# Patient Record
Sex: FEMALE | Race: WHITE | URBAN - METROPOLITAN AREA
[De-identification: names, ages, dates, MRNs, and addresses within clinical notes are randomized per-mention and may not be internally consistent; named-entity substitution may affect disease eponyms.]

---

## 2017-01-26 ENCOUNTER — APPOINTMENT (OUTPATIENT)
Dept: CT IMAGING | Facility: CLINIC | Age: 82
End: 2017-01-26
Attending: EMERGENCY MEDICINE
Payer: MEDICARE

## 2017-01-26 ENCOUNTER — APPOINTMENT (OUTPATIENT)
Dept: GENERAL RADIOLOGY | Facility: CLINIC | Age: 82
End: 2017-01-26
Attending: EMERGENCY MEDICINE
Payer: MEDICARE

## 2017-01-26 ENCOUNTER — HOSPITAL ENCOUNTER (OUTPATIENT)
Facility: CLINIC | Age: 82
Setting detail: OBSERVATION
Discharge: HOME | End: 2017-01-28
Attending: EMERGENCY MEDICINE | Admitting: INTERNAL MEDICINE
Payer: MEDICARE

## 2017-01-26 DIAGNOSIS — S29.9XXA CHEST WALL INJURY, INITIAL ENCOUNTER: ICD-10-CM
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
## 2017-01-26 DIAGNOSIS — I15.8 OTHER SECONDARY HYPERTENSION: ICD-10-CM

## 2017-01-26 LAB
ANION GAP SERPL CALCULATED.3IONS-SCNC: 11 MMOL/L (ref 3–14)
BASOPHILS # BLD AUTO: 0 10E9/L (ref 0–0.2)
BASOPHILS NFR BLD AUTO: 0.4 %
BUN SERPL-MCNC: 23 MG/DL (ref 7–30)
CALCIUM SERPL-MCNC: 8.8 MG/DL (ref 8.5–10.1)
CHLORIDE SERPL-SCNC: 104 MMOL/L (ref 94–109)
CO2 SERPL-SCNC: 23 MMOL/L (ref 20–32)
CREAT SERPL-MCNC: 1.27 MG/DL (ref 0.52–1.04)
DIFFERENTIAL METHOD BLD: ABNORMAL
EOSINOPHIL # BLD AUTO: 0.1 10E9/L (ref 0–0.7)
EOSINOPHIL NFR BLD AUTO: 0.5 %
ERYTHROCYTE [DISTWIDTH] IN BLOOD BY AUTOMATED COUNT: 14.1 % (ref 10–15)
GFR SERPL CREATININE-BSD FRML MDRD: 39 ML/MIN/1.7M2
GLUCOSE SERPL-MCNC: 196 MG/DL (ref 70–99)
HCT VFR BLD AUTO: 38.3 % (ref 35–47)
HGB BLD-MCNC: 12.7 G/DL (ref 11.7–15.7)
IMM GRANULOCYTES # BLD: 0.1 10E9/L (ref 0–0.4)
IMM GRANULOCYTES NFR BLD: 0.9 %
INR PPP: 1.1 (ref 0.86–1.14)
LYMPHOCYTES # BLD AUTO: 0.6 10E9/L (ref 0.8–5.3)
LYMPHOCYTES NFR BLD AUTO: 5.6 %
MCH RBC QN AUTO: 29.3 PG (ref 26.5–33)
MCHC RBC AUTO-ENTMCNC: 33.2 G/DL (ref 31.5–36.5)
MCV RBC AUTO: 89 FL (ref 78–100)
MONOCYTES # BLD AUTO: 0.9 10E9/L (ref 0–1.3)
MONOCYTES NFR BLD AUTO: 9.2 %
NEUTROPHILS # BLD AUTO: 8.1 10E9/L (ref 1.6–8.3)
NEUTROPHILS NFR BLD AUTO: 83.4 %
NRBC # BLD AUTO: 0 10*3/UL
NRBC BLD AUTO-RTO: 0 /100
PLATELET # BLD AUTO: 316 10E9/L (ref 150–450)
POTASSIUM SERPL-SCNC: 4.4 MMOL/L (ref 3.4–5.3)
RBC # BLD AUTO: 4.33 10E12/L (ref 3.8–5.2)
SODIUM SERPL-SCNC: 138 MMOL/L (ref 133–144)
TROPONIN I SERPL-MCNC: NORMAL UG/L (ref 0–0.04)
WBC # BLD AUTO: 9.7 10E9/L (ref 4–11)

## 2017-01-26 PROCEDURE — 70450 CT HEAD/BRAIN W/O DYE: CPT

## 2017-01-26 PROCEDURE — 72125 CT NECK SPINE W/O DYE: CPT

## 2017-01-26 PROCEDURE — 96361 HYDRATE IV INFUSION ADD-ON: CPT

## 2017-01-26 PROCEDURE — 72170 X-RAY EXAM OF PELVIS: CPT

## 2017-01-26 PROCEDURE — 84484 ASSAY OF TROPONIN QUANT: CPT | Performed by: EMERGENCY MEDICINE

## 2017-01-26 PROCEDURE — 80048 BASIC METABOLIC PNL TOTAL CA: CPT | Performed by: EMERGENCY MEDICINE

## 2017-01-26 PROCEDURE — 85025 COMPLETE CBC W/AUTO DIFF WBC: CPT | Performed by: EMERGENCY MEDICINE

## 2017-01-26 PROCEDURE — 25000128 H RX IP 250 OP 636: Performed by: EMERGENCY MEDICINE

## 2017-01-26 PROCEDURE — 93005 ELECTROCARDIOGRAM TRACING: CPT

## 2017-01-26 PROCEDURE — 85610 PROTHROMBIN TIME: CPT | Performed by: EMERGENCY MEDICINE

## 2017-01-26 PROCEDURE — 25000132 ZZH RX MED GY IP 250 OP 250 PS 637: Mod: GY | Performed by: EMERGENCY MEDICINE

## 2017-01-26 PROCEDURE — 73030 X-RAY EXAM OF SHOULDER: CPT | Mod: RT

## 2017-01-26 PROCEDURE — 96360 HYDRATION IV INFUSION INIT: CPT

## 2017-01-26 PROCEDURE — 71010 XR CHEST 1 VW: CPT

## 2017-01-26 PROCEDURE — 99285 EMERGENCY DEPT VISIT HI MDM: CPT | Mod: 25

## 2017-01-26 PROCEDURE — A9270 NON-COVERED ITEM OR SERVICE: HCPCS | Mod: GY | Performed by: EMERGENCY MEDICINE

## 2017-01-26 RX ORDER — LIDOCAINE 40 MG/G
CREAM TOPICAL
Status: DISCONTINUED | OUTPATIENT
Start: 2017-01-26 | End: 2017-01-28 | Stop reason: HOSPADM

## 2017-01-26 RX ORDER — HYDROCODONE BITARTRATE AND ACETAMINOPHEN 5; 325 MG/1; MG/1
2 TABLET ORAL ONCE
Status: COMPLETED | OUTPATIENT
Start: 2017-01-26 | End: 2017-01-26

## 2017-01-26 RX ORDER — HYDROCODONE BITARTRATE AND ACETAMINOPHEN 5; 325 MG/1; MG/1
1 TABLET ORAL 2 TIMES DAILY PRN
Qty: 8 TABLET | Refills: 0 | Status: SHIPPED | OUTPATIENT
Start: 2017-01-26

## 2017-01-26 RX ORDER — SODIUM CHLORIDE 9 MG/ML
INJECTION, SOLUTION INTRAVENOUS CONTINUOUS
Status: DISCONTINUED | OUTPATIENT
Start: 2017-01-26 | End: 2017-01-27

## 2017-01-26 RX ADMIN — SODIUM CHLORIDE: 9 INJECTION, SOLUTION INTRAVENOUS at 18:30

## 2017-01-26 RX ADMIN — HYDROCODONE BITARTRATE AND ACETAMINOPHEN 2 TABLET: 5; 325 TABLET ORAL at 21:57

## 2017-01-26 ASSESSMENT — ENCOUNTER SYMPTOMS
BACK PAIN: 0
SHORTNESS OF BREATH: 0
NECK PAIN: 1
HEADACHES: 0
MYALGIAS: 1

## 2017-01-26 NOTE — ED AVS SNAPSHOT
MRN:[id number]                      After Visit Summary   1/26/2017    Nuria Marinelli    MRN: [id number]           Thank you!     Thank you for choosing [facility] for your care. Our goal is always to provide you with excellent care. Hearing back from our patients is one way we can continue to improve our services. Please take a few minutes to complete the written survey that you may receive in the mail after you visit. If you would like to speak to someone directly about your visit please contact Patient Relations at [phone]. Thank you!          Patient Information     Date Of Birth          7/18/1925        About your hospital stay     You were admitted on:  January 27, 2017 You last received care in the:  [facility] Observation Department    You were discharged on:  January 28, 2017        Reason for your hospital stay       You were admitted for concerns of falling and pain related to that. Your pain improved with low dose oxcodone and tylenol. At home we recommend you primarily use the tylenol and if needed small dose oxycodone. If you use the oxycodone you should take a stool softener.    Your creatinine was a little high here so we held your Losartan while you were here but your blood pressure is high so we would like you to restart.                  Who to Call     For medical emergencies, please call 911.  For non-urgent questions about your medical care, please call your primary care provider or clinic, [phone]          Attending Provider     Provider    Roderick Barba MD Abdissa, Mesfin E, MD Brown-Switzer, Christa L,        Primary Care Provider Office Phone # Fax #    Kimani Fletcher [phone]       [facility] [address]  [facility] [address]        After Care Instructions     Activity       Your activity upon discharge: activity as tolerated            Diet       Follow this diet upon discharge:  Regular                  Follow-up Appointments     Follow-up and recommended labs and tests        Follow up with primary care provider, NATALI SERRANO, within 7 days to evaluate medication change and for hospital follow- up.  Repeat creatinine level and follow up on blood pressure.                             Further instructions from your care team       Discharge Instructions  Trauma    You were seen today for an injury due to some kind of trauma (crash, fall, etc.).  Some injuries may not show up until after you leave the Emergency Department.  It is important that you pay attention to these instructions and follow-up with your regular doctor as instructed.    Return to the Emergency Department right away if:    You have abdominal pain or bruises, chest pain, pain in a new area, or pain that is getting worse.    You get short of breath.    You develop a fever over 101 degrees.    You have weakness in your arms or legs.    You faint or you are very lightheaded.    You have any new symptoms, you are feeling weak or unusually ill, or something worries you.     Injuries to the brain are possible with any accident.  Return right away if you have confusion, vomiting more than once, difficulty walking or a headache that is getting worse. Bring a child or a person who can t talk back if they seem to be behaving in an abnormal way.      MORE INFORMATION:    General Injuries:    Aches and pains are usually worse the day after your accident, but should not be severe, and should start getting better after that. Aches and pains are common in the neck and back.    Injuries from your accident may prevent you from working.  Follow-up with your regular doctor to get a work note and to find out how long you will not be able to work.    Pain medications or your injuries may make it unsafe for you to drive or operate machinery.    Use ice to injured areas for the first one or two days. Apply a bag of ice wrapped in a cloth for  about 15 minutes at a time. You can do this as often as once an hour. Do not sleep with an ice pack, since it can burn you.     You can use non-prescription pain medicine, like Tylenol  (acetaminophen), Advil  (ibuprofen), Motrin  (ibuprofen), Nuprin  (ibuprofen) if your emergency doctor or your own doctor told you this is okay. Tylenol  (acetaminophen) is in many prescription medicines and non-prescription medicines--check all of your medicines to be sure you aren t taking more than 3000 mg per day.    Limit your activity for at least one or two days. Avoid doing things that hurt.    You need to see your doctor if any injured area is not back to normal in 1 week.    Car Accident:    If you have been on a backboard or had a neck collar on, this may make you stiff and sore.  This should get better in 1-2 days.  Return to the Emergency Department if the pain or discomfort is severe or gets worse.    Be careful of shards of glass on your body or in your belongings.    Fractures, Sprains, and Strains:    Return to the Emergency Department right away if your injured area gets more painful, if the splint or dressing seems to be too tight, if it gets numb or tingly past the injury, or if the area past the injury gets pale, blue, or cold.    Use your crutches if you were given them today. Don t put weight on the injured area until the pain is gone.    Keep the injured area above the level of your heart while laying or sitting down.  This well help lessen the swelling (puffiness) and the pain.    You may use an elastic bandage (Ace  Wrap) if it makes you more comfortable. Wrap it just tight enough to provide mild compression, and loosen it if you get swelling past the bandage.    Note about X-rays: If you had x-rays done today, they were read by your emergency physician. They will also be read later by a radiologist. We will contact you if the radiologist thinks they show something different than the emergency physician did.  Remember that there are some fractures (breaks in the bone) that can t be seen right away. Even if your x-rays today were normal, you must see your doctor in clinic to re-check.     Splints:    A splint put on in the Emergency Department is temporary. Your regular doctor or orthopedic doctor will remove it, and replace it with a cast or boot if needed.    Keep the splint dry. Cover it with a plastic bag when you wash. Even with a plastic bag, you still can t get in water or let water get right on it. If it does get wet, you should come back or see your doctor to have it replaced.    Do not put objects inside the splint to scratch.    If there is an elastic bandage (Ace  Wrap) holding the splint on this may be loosened a little to relieve pressure or pain.  If pain continues return to the Emergency Department right away.    Return if the splint starts cutting into your skin.    Do not remove your splint by yourself unless told to by your doctor. You can t take it off and put it back on again.     Wounds:    Infections can follow many injuries. Watch for fevers, redness spreading from the wound, pus or stitches that open up. Return here or see your doctor if these happen.    There can always be glass, wood, dirt or other things in any wound.  They won t always show up even on x-rays.  If a wound doesn t heal, this may be why, and it is important to follow-up with your regular doctor. Small pieces of glass or other materials may work their way out on their own.    Cuts or scrapes may start to bleed after leaving the Emergency Department.  If this happens, hold pressure on the bleeding area with a clean cloth or put pressure over the bandage.  If the bleeding doesn t stop after you use constant pressure for   hour, you should return to the Emergency Department for further treatment.    Any bandage or dressing put on here should be removed in 12-24 hours, or as your doctor instructs. Remove the dressing sooner if it  seems too tight or painful, or if it is getting numb, tingly, or pale past the dressing.    After you take off the dressing, wash the cut or scrape with soap and water once or twice a day.    Apply ointment like Bacitracin  (polypeptide antibiotic) to scrapes or cuts, and keep them covered with a Band-Aid  or gauze if possible, until they heal up or until your stitches are taken out.    Dermabond  or Steri-Strips  should be left alone and will come off by themselves.  Dissolving stitches should go away or fall out within about a week.    Regular stitches need to be taken out by your doctor in clinic.  Call today and schedule an appointment.  Leave your stitches in for as long as you were told today.    Most injuries are preventable!  As your local emergency physicians, we encourage you to:    Wear your seat belt.    Do not talk on your cell phone while driving.    Do not read or send text messages while driving.    Wear a bike or motorcycle helmet.    Wear a helmet while skiing and snowboarding.    Wear personal flotation devices at all times while on the water.    Always have your child in a car seat.    Do not allow children less than 12 years old to ride in the front seat.    Go to the CDC website to find more information on preventing injures:  http://www.cdc.gov/injury/index.html    If you were given a prescription for medicine here today, be sure to read all of the information (including the package insert) that comes with your prescription.  This will include important information about the medicine, its side effects, and any warnings that you need to know about.  The pharmacist who fills the prescription can provide more information and answer questions you may have about the medicine.  If you have questions or concerns that the pharmacist cannot address, please call or return to the Emergency Department.     Opioid Medication Information    Pain medications are among the most commonly prescribed medicines,  so we are including this information for all our patients. If you did not receive pain medication or get a prescription for pain medicine, you can ignore it.     You may have been given a prescription for an opioid (narcotic) pain medicine and/or have received a pain medicine while here in the Emergency Department. These medicines can make you drowsy or impaired. You must not drive, operate dangerous equipment, or engage in any other dangerous activities while taking these medications. If you drive while taking these medications, you could be arrested for DUI, or driving under the influence. Do not drink any alcohol while you are taking these medications.     Opioid pain medications can cause addiction. If you have a history of chemical dependency of any type, you are at a higher risk of becoming addicted to pain medications.  Only take these prescribed medications to treat your pain when all other options have been tried. Take it for as short a time and as few doses as possible. Store your pain pills in a secure place, as they are frequently stolen and provide a dangerous opportunity for children or visitors in your house to start abusing these powerful medications. We will not replace any lost or stolen medicine.  As soon as your pain is better, you should flush all your remaining medication.     Many prescription pain medications contain Tylenol  (acetaminophen), including Vicodin , Tylenol #3 , Norco , Lortab , and Percocet .  You should not take any extra pills of Tylenol  if you are using these prescription medications or you can get very sick.  Do not ever take more than 3000 mg of acetaminophen in any 24 hour period.    All opioids tend to cause constipation. Drink plenty of water and eat foods that have a lot of fiber, such as fruits, vegetables, prune juice, apple juice and high fiber cereal.  Take a laxative if you don t move your bowels at least every other day. Miralax , Milk of Magnesia, Colace , or  Senna  can be used to keep you regular.      Remember that you can always come back to the Emergency Department if you are not able to see your regular doctor in the amount of time listed above, if you get any new symptoms, or if there is anything that worries you.      Discharge Instructions  Incidental Findings    An incidental finding is something unexpected that was found while you were being treated today.  While this finding is not an emergency, you will want to follow up with your primary doctor to determine if anything should be done about it.    These findings can come from:    Checking your vital signs (example: high blood pressure).    Taking your history (example: unexplained weight loss).    The physical exam (example: a heart murmur).    Laboratory study (example: anemia).    X-rays/ultrasound/CT or other imaging (example: an unexplained mass).    Return to the Emergency Department if:    Your condition worsens.    You develop unexpected pain.    You now develop new symptoms or have new concerns.    Follow up with your doctor:    Follow up within the next week to discuss the results of all of your tests and about the main reason for your visit today.    Inform your doctor of what testing you had done today. You may want to obtain copies of your results from the medical records department.    Modern medical technology has become very sensitive.  Unexpected or incidental findings are very common and do not always indicate a problem.  However, sometimes problems are found very early before symptoms have even started. While these findings are not an emergency, this may allow your primary care doctor to start the earliest treatment possible. Sometimes these incidental findings are not discovered until later when the final report is reported or when your primary care doctor compares our finding to your previous studies. Therefore, it is important for you to always review all results and studies with your  "primary care doctor or clinic after an Emergency Department visit.  If you were given a prescription for medicine here today, be sure to read all of the information (including the package insert) that comes with your prescription.  This will include important information about the medicine, its side effects, and any warnings that you need to know about.  The pharmacist who fills the prescription can provide more information and answer questions you may have about the medicine.  If you have questions or concerns that the pharmacist cannot address, please call or return to the Emergency Department.   Remember that you can always come back to the Emergency Department if you are not able to see your regular doctor in the amount of time listed above, if you get any new symptoms, or if there is anything that worries you.        Pending Results     Date and Time Order Name Status Description    1/28/2017 0610 Urine Culture Aerobic Bacterial Preliminary             Statement of Approval     Ordered          01/28/17 1129  I have reviewed and agree with all the recommendations and orders detailed in this document.   EFFECTIVE NOW     Approved and electronically signed by:  Gissell Gann PA-C             Admission Information        Provider Department Dept Phone    1/26/2017 Adela Hooker DO  Observation Dept [phone]      Your Vitals Were     Blood Pressure Pulse Temperature Respirations Weight Pulse Oximetry    150/55 mmHg 59 97.6  F (36.4  C) (Oral) 20 67.586 kg (149 lb) 93%      MyChart Information     [employer] lets you send messages to your doctor, view your test results, renew your prescriptions, schedule appointments and more. To sign up, go to [email or web address] . Click on \"Log in\" on the left side of the screen, which will take you to the Welcome page. Then click on \"Sign up Now\" on the right side of the page.     You will be asked to enter the access code listed below, as well as " some personal information. Please follow the directions to create your username and password.     Your access code is: [email or web address]  Expires: 2017 10:45 AM     Your access code will  in 90 days. If you need help or a new code, please call your [facility] clinic or [phone].        Care EveryWhere ID     This is your Care EveryWhere ID. This could be used by other organizations to access your [facility] medical records  [id number]           Review of your medicines      START taking        Dose / Directions    acetaminophen 500 MG tablet   Commonly known as:  TYLENOL   Used for:  Fall, initial encounter        Dose:  500-1000 mg   Take 1-2 tablets (500-1,000 mg) by mouth every 6 hours as needed for mild pain   Refills:  0       HYDROcodone-acetaminophen 5-325 MG per tablet   Commonly known as:  NORCO        Dose:  1 tablet   Take 1 tablet by mouth 2 times daily as needed for moderate to severe pain   Quantity:  8 tablet   Refills:  0       oxyCODONE 5 MG IR tablet   Commonly known as:  ROXICODONE   Used for:  Fall, initial encounter        Dose:  2.5 mg   Take 0.5 tablets (2.5 mg) by mouth every 3 hours as needed for breakthrough pain or moderate to severe pain   Quantity:  6 tablet   Refills:  0         CONTINUE these medicines which have NOT CHANGED        Dose / Directions    blood glucose monitoring test strip   Commonly known as:  ONE TOUCH TEST STRIPS   Used for:  Type 2 diabetes, HbA1C goal < 8% (H)        by In Vitro route. Test as directed   Quantity:  1 Box   Refills:  12       BLOOD GLUCOSE TEST STRIPS STRP   Used for:  Essential hypertension, benign, Type II or unspecified type diabetes mellitus without mention of complication, not stated as uncontrolled        for one touch ultra meter   Quantity:  testing BID   Refills:  prn one yr       GLUCOSE CONTROL Liqd   Used for:  Essential hypertension, benign, Type II or unspecified type diabetes mellitus without mention of complication, not stated  as uncontrolled        for one touch ultra   Quantity:  testing BID   Refills:  prn one yr       insulin glargine 100 UNIT/ML injection   Commonly known as:  LANTUS        Dose:  30 Units   Inject 30 Units Subcutaneous At Bedtime.   Refills:  0       LOSARTAN POTASSIUM PO        Dose:  100 mg   Take 100 mg by mouth every evening   Refills:  0       metoprolol 25 MG 24 hr tablet   Commonly known as:  TOPROL-XL        Dose:  25 mg   Take 25 mg by mouth every evening   Refills:  0       SERTRALINE HCL PO        Dose:  50 mg   Take 50 mg by mouth every evening   Refills:  0       thin lancets   Commonly known as:  NO BRAND SPECIFIED   Used for:  Essential hypertension, benign        Dose:  1 Units   1 Units daily.   Quantity:  90 each   Refills:  3       traZODone 50 MG tablet   Commonly known as:  DESYREL        Dose:   mg   Take  mg by mouth nightly as needed (manic episodes)   Refills:  0            Where to get your medicines      Some of these will need a paper prescription and others can be bought over the counter. Ask your nurse if you have questions.     Bring a paper prescription for each of these medications    - HYDROcodone-acetaminophen 5-325 MG per tablet  - oxyCODONE 5 MG IR tablet    You don't need a prescription for these medications    - acetaminophen 500 MG tablet             Protect others around you: Learn how to safely use, store and throw away your medicines at www.disposemymeds.org.             Medication List: This is a list of all your medications and when to take them. Check marks below indicate your daily home schedule. Keep this list as a reference.      Medications           Morning Afternoon Evening Bedtime As Needed    acetaminophen 500 MG tablet   Commonly known as:  TYLENOL   Take 1-2 tablets (500-1,000 mg) by mouth every 6 hours as needed for mild pain   Last time this was given:  650 mg on 1/28/2017 10:06 AM                                blood glucose monitoring test strip    Commonly known as:  ONE TOUCH TEST STRIPS   by In Vitro route. Test as directed                                BLOOD GLUCOSE TEST STRIPS STRP   for one touch ultra meter                                GLUCOSE CONTROL Liqd   for one touch ultra                                HYDROcodone-acetaminophen 5-325 MG per tablet   Commonly known as:  NORCO   Take 1 tablet by mouth 2 times daily as needed for moderate to severe pain   Last time this was given:  2 tablets on 1/26/2017  9:57 PM                                insulin glargine 100 UNIT/ML injection   Commonly known as:  LANTUS   Inject 30 Units Subcutaneous At Bedtime.   Last time this was given:  15 Units on 1/27/2017 10:10 PM                                LOSARTAN POTASSIUM PO   Take 100 mg by mouth every evening                                metoprolol 25 MG 24 hr tablet   Commonly known as:  TOPROL-XL   Take 25 mg by mouth every evening   Last time this was given:  25 mg on 1/27/2017  8:16 PM                                oxyCODONE 5 MG IR tablet   Commonly known as:  ROXICODONE   Take 0.5 tablets (2.5 mg) by mouth every 3 hours as needed for breakthrough pain or moderate to severe pain   Last time this was given:  2.5 mg on 1/28/2017 10:06 AM                                SERTRALINE HCL PO   Take 50 mg by mouth every evening   Last time this was given:  50 mg on 1/28/2017  8:42 AM                                thin lancets   Commonly known as:  NO BRAND SPECIFIED   1 Units daily.                                traZODone 50 MG tablet   Commonly known as:  DESYREL   Take  mg by mouth nightly as needed (manic episodes)                                          More Information        Patient Education    Oxycodone Hydrochloride Oral capsule    Oxycodone Hydrochloride Oral solution    Oxycodone Hydrochloride Oral tablet    Oxycodone Hydrochloride Oral tablet [Abuse Deterrent]    Oxycodone Hydrochloride Oral tablet, extended-release  Oxycodone  Hydrochloride Oral tablet  What is this medicine?  OXYCODONE (ox i KOE done) is a pain reliever. It is used to treat moderate to severe pain.  This medicine may be used for other purposes; ask your health care provider or pharmacist if you have questions.  What should I tell my health care provider before I take this medicine?  They need to know if you have any of these conditions:    Stark's disease    brain tumor    drug abuse or addiction    head injury    heart disease    if you frequently drink alcohol containing drinks    kidney disease or problems going to the bathroom    liver disease    lung disease, asthma, or breathing problems    mental problems    an unusual or allergic reaction to oxycodone, codeine, hydrocodone, morphine, other medicines, foods, dyes, or preservatives    pregnant or trying to get pregnant    breast-feeding  How should I use this medicine?  Take this medicine by mouth with a glass of water. Follow the directions on the prescription label. You can take it with or without food. If it upsets your stomach, take it with food. Take your medicine at regular intervals. Do not take it more often than directed. Do not stop taking except on your doctor's advice.  Some brands of this medicine, like Oxecta, have special instructions. Ask your doctor or pharmacist if these directions are for you: Do not cut, crush or chew this medicine. Swallow only one tablet at a time. Do not wet, soak, or lick the tablet before you take it.  Talk to your pediatrician regarding the use of this medicine in children. Special care may be needed.  Overdosage: If you think you have taken too much of this medicine contact a poison control center or emergency room at once.  NOTE: This medicine is only for you. Do not share this medicine with others.  What if I miss a dose?  If you miss a dose, take it as soon as you can. If it is almost time for your next dose, take only that dose. Do not take double or extra  doses.  What may interact with this medicine?    alcohol    antihistamines    certain medicines used for nausea like chlorpromazine, droperidol    erythromycin    ketoconazole    medicines for depression, anxiety, or psychotic disturbances    medicines for sleep    muscle relaxants    naloxone    naltrexone    narcotic medicines (opiates) for pain    nilotinib    phenobarbital    phenytoin    rifampin    ritonavir    voriconazole  This list may not describe all possible interactions. Give your health care provider a list of all the medicines, herbs, non-prescription drugs, or dietary supplements you use. Also tell them if you smoke, drink alcohol, or use illegal drugs. Some items may interact with your medicine.  What should I watch for while using this medicine?  Tell your doctor or health care professional if your pain does not go away, if it gets worse, or if you have new or a different type of pain. You may develop tolerance to the medicine. Tolerance means that you will need a higher dose of the medicine for pain relief. Tolerance is normal and is expected if you take this medicine for a long time.  Do not suddenly stop taking your medicine because you may develop a severe reaction. Your body becomes used to the medicine. This does NOT mean you are addicted. Addiction is a behavior related to getting and using a drug for a non-medical reason. If you have pain, you have a medical reason to take pain medicine. Your doctor will tell you how much medicine to take. If your doctor wants you to stop the medicine, the dose will be slowly lowered over time to avoid any side effects.  You may get drowsy or dizzy when you first start taking this medicine or change doses. Do not drive, use machinery, or do anything that may be dangerous until you know how the medicine affects you. Stand or sit up slowly.  There are different types of narcotic medicines (opiates) for pain. If you take more than one type at the same time,  you may have more side effects. Give your health care provider a list of all medicines you use. Your doctor will tell you how much medicine to take. Do not take more medicine than directed. Call emergency for help if you have problems breathing.  This medicine will cause constipation. Try to have a bowel movement at least every 2 to 3 days. If you do not have a bowel movement for 3 days, call your doctor or health care professional.  Your mouth may get dry. Drinking water, chewing sugarless gum, or sucking on hard candy may help. See your dentist every 6 months.  What side effects may I notice from receiving this medicine?  Side effects that you should report to your doctor or health care professional as soon as possible:    allergic reactions like skin rash, itching or hives, swelling of the face, lips, or tongue    breathing problems    confusion    feeling faint or lightheaded, falls    trouble passing urine or change in the amount of urine    unusually weak or tired  Side effects that usually do not require medical attention (report to your doctor or health care professional if they continue or are bothersome):    constipation    dry mouth    itching    nausea, vomiting    upset stomach  This list may not describe all possible side effects. Call your doctor for medical advice about side effects. You may report side effects to FDA at 5-610-FDA-2025.  Where should I keep my medicine?  Keep out of the reach of children. This medicine can be abused. Keep your medicine in a safe place to protect it from theft. Do not share this medicine with anyone. Selling or giving away this medicine is dangerous and against the law.  Store at room temperature between 15 and 30 degrees C (59 and 86 degrees F). Protect from light. Keep container tightly closed.  This medicine may cause accidental overdose and death if it is taken by other adults, children, or pets. Flush any unused medicine down the toilet to reduce the chance of  harm. Do not use the medicine after the expiration date.  NOTE: This sheet is a summary. It may not cover all possible information. If you have questions about this medicine, talk to your doctor, pharmacist, or health care provider.  NOTE:This sheet is a summary. It may not cover all possible information. If you have questions about this medicine, talk to your doctor, pharmacist, or health care provider. Copyright  2016 [employer] Standard

## 2017-01-26 NOTE — ED AVS SNAPSHOT
[facility] Observation Department    [address]    [facility] [address]    Phone:  [phone]                                       Nuria Marinelli   MRN: [id number]    Department:  [facility] Observation Department   Date of Visit:  1/26/2017           After Visit Summary Signature Page     I have received my discharge instructions, and my questions have been answered. I have discussed any challenges I see with this plan with the nurse or doctor.    ..........................................................................................................................................  Patient/Patient Representative Signature      ..........................................................................................................................................  Patient Representative Print Name and Relationship to Patient    ..................................................               ................................................  Date                                            Time    ..........................................................................................................................................  Reviewed by Signature/Title    ...................................................              ..............................................  Date                                                            Time
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
## 2017-01-27 LAB
ANION GAP SERPL CALCULATED.3IONS-SCNC: 6 MMOL/L (ref 3–14)
BUN SERPL-MCNC: 22 MG/DL (ref 7–30)
CALCIUM SERPL-MCNC: 8.5 MG/DL (ref 8.5–10.1)
CHLORIDE SERPL-SCNC: 109 MMOL/L (ref 94–109)
CO2 SERPL-SCNC: 25 MMOL/L (ref 20–32)
CREAT SERPL-MCNC: 1.33 MG/DL (ref 0.52–1.04)
GFR SERPL CREATININE-BSD FRML MDRD: 37 ML/MIN/1.7M2
GLUCOSE BLDC GLUCOMTR-MCNC: 138 MG/DL (ref 70–99)
GLUCOSE BLDC GLUCOMTR-MCNC: 168 MG/DL (ref 70–99)
GLUCOSE BLDC GLUCOMTR-MCNC: 204 MG/DL (ref 70–99)
GLUCOSE BLDC GLUCOMTR-MCNC: 220 MG/DL (ref 70–99)
GLUCOSE BLDC GLUCOMTR-MCNC: 226 MG/DL (ref 70–99)
GLUCOSE SERPL-MCNC: 169 MG/DL (ref 70–99)
HBA1C MFR BLD: 10.1 % (ref 4.3–6)
INTERPRETATION ECG - MUSE: NORMAL
POTASSIUM SERPL-SCNC: 4.3 MMOL/L (ref 3.4–5.3)
SODIUM SERPL-SCNC: 140 MMOL/L (ref 133–144)

## 2017-01-27 PROCEDURE — 25000128 H RX IP 250 OP 636: Performed by: INTERNAL MEDICINE

## 2017-01-27 PROCEDURE — 25000132 ZZH RX MED GY IP 250 OP 250 PS 637: Mod: GY | Performed by: INTERNAL MEDICINE

## 2017-01-27 PROCEDURE — G0378 HOSPITAL OBSERVATION PER HR: HCPCS

## 2017-01-27 PROCEDURE — 96361 HYDRATE IV INFUSION ADD-ON: CPT

## 2017-01-27 PROCEDURE — 25000128 H RX IP 250 OP 636: Performed by: EMERGENCY MEDICINE

## 2017-01-27 PROCEDURE — A9270 NON-COVERED ITEM OR SERVICE: HCPCS | Mod: GY | Performed by: PHYSICIAN ASSISTANT

## 2017-01-27 PROCEDURE — 80048 BASIC METABOLIC PNL TOTAL CA: CPT | Performed by: PHYSICIAN ASSISTANT

## 2017-01-27 PROCEDURE — 40000893 ZZH STATISTIC PT IP EVAL DEFER: Performed by: PHYSICAL THERAPIST

## 2017-01-27 PROCEDURE — A9270 NON-COVERED ITEM OR SERVICE: HCPCS | Mod: GY | Performed by: INTERNAL MEDICINE

## 2017-01-27 PROCEDURE — 83036 HEMOGLOBIN GLYCOSYLATED A1C: CPT

## 2017-01-27 PROCEDURE — 36415 COLL VENOUS BLD VENIPUNCTURE: CPT | Performed by: PHYSICIAN ASSISTANT

## 2017-01-27 PROCEDURE — 96372 THER/PROPH/DIAG INJ SC/IM: CPT

## 2017-01-27 PROCEDURE — 99220 ZZC INITIAL OBSERVATION CARE,LEVL III: CPT | Performed by: INTERNAL MEDICINE

## 2017-01-27 PROCEDURE — 00000146 ZZHCL STATISTIC GLUCOSE BY METER IP

## 2017-01-27 PROCEDURE — 25000132 ZZH RX MED GY IP 250 OP 250 PS 637: Mod: GY | Performed by: PHYSICIAN ASSISTANT

## 2017-01-27 RX ORDER — METOPROLOL SUCCINATE 25 MG/1
25 TABLET, EXTENDED RELEASE ORAL EVERY EVENING
Status: DISCONTINUED | OUTPATIENT
Start: 2017-01-27 | End: 2017-01-28 | Stop reason: HOSPADM

## 2017-01-27 RX ORDER — LOSARTAN POTASSIUM 25 MG/1
100 TABLET ORAL EVERY EVENING
Status: DISCONTINUED | OUTPATIENT
Start: 2017-01-27 | End: 2017-01-27

## 2017-01-27 RX ORDER — METOPROLOL SUCCINATE 25 MG/1
25 TABLET, EXTENDED RELEASE ORAL EVERY EVENING
COMMUNITY

## 2017-01-27 RX ORDER — TRAZODONE HYDROCHLORIDE 50 MG/1
50-100 TABLET, FILM COATED ORAL
Status: DISCONTINUED | OUTPATIENT
Start: 2017-01-27 | End: 2017-01-28 | Stop reason: HOSPADM

## 2017-01-27 RX ORDER — NICOTINE POLACRILEX 4 MG
15-30 LOZENGE BUCCAL
Status: DISCONTINUED | OUTPATIENT
Start: 2017-01-27 | End: 2017-01-28 | Stop reason: HOSPADM

## 2017-01-27 RX ORDER — ACETAMINOPHEN 325 MG/1
650 TABLET ORAL EVERY 4 HOURS PRN
Status: DISCONTINUED | OUTPATIENT
Start: 2017-01-27 | End: 2017-01-28 | Stop reason: HOSPADM

## 2017-01-27 RX ORDER — SODIUM CHLORIDE 9 MG/ML
INJECTION, SOLUTION INTRAVENOUS CONTINUOUS
Status: DISCONTINUED | OUTPATIENT
Start: 2017-01-27 | End: 2017-01-28

## 2017-01-27 RX ORDER — DEXTROSE MONOHYDRATE 25 G/50ML
25-50 INJECTION, SOLUTION INTRAVENOUS
Status: DISCONTINUED | OUTPATIENT
Start: 2017-01-27 | End: 2017-01-28 | Stop reason: HOSPADM

## 2017-01-27 RX ORDER — LIDOCAINE 50 MG/G
1 PATCH TOPICAL
Status: DISCONTINUED | OUTPATIENT
Start: 2017-01-27 | End: 2017-01-28 | Stop reason: HOSPADM

## 2017-01-27 RX ORDER — OXYCODONE HYDROCHLORIDE 5 MG/1
5-10 TABLET ORAL
Status: DISCONTINUED | OUTPATIENT
Start: 2017-01-27 | End: 2017-01-27

## 2017-01-27 RX ORDER — HYDROMORPHONE HYDROCHLORIDE 1 MG/ML
.3-.5 INJECTION, SOLUTION INTRAMUSCULAR; INTRAVENOUS; SUBCUTANEOUS
Status: DISCONTINUED | OUTPATIENT
Start: 2017-01-27 | End: 2017-01-27

## 2017-01-27 RX ORDER — METOPROLOL SUCCINATE 50 MG/1
50 TABLET, EXTENDED RELEASE ORAL DAILY
Status: DISCONTINUED | OUTPATIENT
Start: 2017-01-27 | End: 2017-01-27

## 2017-01-27 RX ORDER — NALOXONE HYDROCHLORIDE 0.4 MG/ML
.1-.4 INJECTION, SOLUTION INTRAMUSCULAR; INTRAVENOUS; SUBCUTANEOUS
Status: DISCONTINUED | OUTPATIENT
Start: 2017-01-27 | End: 2017-01-28 | Stop reason: HOSPADM

## 2017-01-27 RX ORDER — TRAZODONE HYDROCHLORIDE 50 MG/1
50-100 TABLET, FILM COATED ORAL
COMMUNITY

## 2017-01-27 RX ADMIN — INSULIN ASPART 4 UNITS: 100 INJECTION, SOLUTION INTRAVENOUS; SUBCUTANEOUS at 18:03

## 2017-01-27 RX ADMIN — INSULIN GLARGINE 15 UNITS: 100 INJECTION, SOLUTION SUBCUTANEOUS at 22:10

## 2017-01-27 RX ADMIN — ACETAMINOPHEN 650 MG: 325 TABLET, FILM COATED ORAL at 19:10

## 2017-01-27 RX ADMIN — OXYCODONE HYDROCHLORIDE 2.5 MG: 5 TABLET ORAL at 12:58

## 2017-01-27 RX ADMIN — SODIUM CHLORIDE: 9 INJECTION, SOLUTION INTRAVENOUS at 15:02

## 2017-01-27 RX ADMIN — LIDOCAINE 1 PATCH: 50 PATCH CUTANEOUS at 12:38

## 2017-01-27 RX ADMIN — METOPROLOL SUCCINATE 25 MG: 25 TABLET, EXTENDED RELEASE ORAL at 20:16

## 2017-01-27 RX ADMIN — SODIUM CHLORIDE: 9 INJECTION, SOLUTION INTRAVENOUS at 00:30

## 2017-01-27 RX ADMIN — SODIUM CHLORIDE: 9 INJECTION, SOLUTION INTRAVENOUS at 03:09

## 2017-01-27 RX ADMIN — ACETAMINOPHEN 650 MG: 325 TABLET, FILM COATED ORAL at 12:58

## 2017-01-27 RX ADMIN — OXYCODONE HYDROCHLORIDE 2.5 MG: 5 TABLET ORAL at 19:10

## 2017-01-27 RX ADMIN — SERTRALINE HYDROCHLORIDE 50 MG: 50 TABLET ORAL at 12:39

## 2017-01-27 NOTE — ED NOTES
OBSERVATION patient IN TIME: 0017  Room #208    Living Situation (if not independent, order SW consult): with daughter    Activity level at baseline: indpendent  Activity level on admit:     Is patient a falls risk? Yes  Reason for falls risk:  Mobility, hx of falls  Falls armband on?  YES  Within Arm's Reach? Yes   Bed alarm turned on?   YES  Personal alarm in place and turned on?   No    Patient registered to observation; given Patient Bill of Rights; given the opportunity to ask questions about observation status and their plan of care.  Patient has been oriented to the observation room, bathroom, and call light is in place.    : Annpooja

## 2017-01-27 NOTE — ED NOTES
RN attempted to ambulate pt per MD. Pt was in severe pain and had a very difficult time moving from position of lying flat to standing position. Pt's daughter does not feel comfortable taking pt home as she is concerned she will not be able to adequately manage pt's pain and care for her. MD notified. Pt is back in bed, warm blankets, nutrition and fluids provided.

## 2017-01-27 NOTE — ED NOTES
PRIMARY DIAGNOSIS: /right chest pain/generalized weakness  OUTPATIENT/OBSERVATION GOALS TO BE MET BEFORE DISCHARGE:    1. Tolerate PO Intake: Yes  2. Cleared for discharge from consultants involved: N/A  3. ADLs back to baseline?  No,pt has generalized weakness and needs assist of one ,pt is normally independent at home.  4. Activity and level of assistance: Up with assist of one and walker.  5. Pain status:c/o right chest pain with movement,oxycodone and tylenol given for pain,lidocaine patch applied too.  6. Barriers to discharge noted No  Is patient a falls risk? Yes  Reason for falls risk:  Mobility  Falls armband on?  YES,    Within Arm's Reach? Yes    Bed alarm turned on?   YES,    Personal alarm in place and turned on?   YES,   Pt. will probably discharge home tomorrow  with her daughter.

## 2017-01-27 NOTE — PROGRESS NOTES
[facility]  Observation Unit  Progress Note    Date of Service: 1/27/2017    Patient: Nuria Marinelli  MRN: [id number]  Admission Date: 1/26/2017  Hospital Day # 1    Assessment & Plan: Nuria Marinelli is a 89 year old female with a history of Breast Cancer, HTN, Diabetes Mellitus Type 2, Alzheimer's Dementia, PUD, GIB, Hiatel Hernia who presented to the ED from home with her daughter on 1/26/2017 after a fall 1/24/2017 with associated with chest pain and weakness,unsteady gait and decreased appetite.      Right Chest Wall and Right Shoulder Pain s/p fall on 1/24 - fall reported as mechanical in nature.  DOT likely contributing to the fall as well.  Imaging in the ED with no acute fracture identified.  Troponin negative.  Likely more MSK pain.  S/p Norco 10/325mg last night with increase lethargy today.   Family declines the need for Physical Therapy assessment or home care referral and need for social work assessment.    - continue pain control with Tylenol and low dose Oxycodone.  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  - will add Lidocaine patch        DOT - baseline creatinine 1-1.2.  Creatinine up to 1.42 since 1/17 when she was diagnosed with a UTI.  S/p 7 day course of Bactrim which she completed 1/24.  DOT likely a combination of hypovolemia, recent UTI while on Bactrim and Losartan.  Creatinine today after IVF hydration still 1.33.    - continue IVF hydration   - recheck UA  - hold Losartan dose tonight  - repeat BMP in am     Alzheimer's Dementia, Depression - diagnosed years ago.  Lives with her daughter who is very supportive.  When the daughter is not home, she has a caregiver.  Ambulates independently at home.   - continue home Trazodone qhs prn and Sertraline     HTN - stable.    - continue home Metoprolol XL  - will hold home Losartan for now 2/2 DOT    Diabetes Mellitus Type II - last hemoglobin A1C (10/2016) 9.4.  Blood sugars today ranging 138-204.  Home medications include Lantus 30 units  qhs.  Not taking po well today.  - continue home Lantus.  Will order 1/2 the home dose tonight 2/2 poor po intake  - start insulin sliding scale  - diabetic diet    Hyperlipidemia - last lipid panel (11/2015) Tchol 230, HDL 38, .  - not currently on any medications    CODE: DNR/DNI  DVT: SCD  Diet/fluids: diabetic  Disposition: likely discharge home with daughter in am    Imelda Masters MS LAZARO  Hospitalist Physician Assistant  [facility]  Pager: [phone]      Subjective & Interval Hx:    Patient has dementia and is not oriented.  She has right sided anterior shoulder and chest wall pain.  Daughter is at the bedside and helpful.  She has not eaten well today and has only ambulated to the bathroom with assistance.      Last 24 hr care team notes reviewed.   ROS:  Unable to complete 2/2 patient's dementia    Physical Exam:    Blood pressure 172/81, pulse 59, temperature 98  F (36.7  C), temperature source Axillary, resp. rate 18, weight 67.586 kg (149 lb), SpO2 93 %.  General: Alert, sleeping, easily arousable  HEENT: AT/NC, sclera anicteric, PERRL  Resp: clear to auscultation bilaterally, no crackles or wheezes  Cardiac: regular rate and rhythm.  Pain to upper right chest to palpation.  No ecchymosis present  Abdomen: Soft, nontender, nondistended. +BS.   Extremities: Patient refusing full ROM testing.  Moving RUE spontaneously and groaning in pain to palpation of right shoulder.  Skin: Warm and dry, no jaundice or rash  Neuro: Alert and knows her daughter.  Not oriented to time or place.      Labs & Images:  Reviewed in Epic   Medications:  Reviewed in [facility]  Current Facility-Administered Medications   Medication     sertraline (ZOLOFT) tablet 50 mg     glucose 40 % gel 15-30 g    Or     dextrose 50 % injection 25-50 mL    Or     glucagon injection 1 mg     naloxone (NARCAN) injection 0.1-0.4 mg     insulin aspart (NovoLOG) inj (RAPID ACTING)     insulin aspart (NovoLOG) inj (RAPID ACTING)      acetaminophen (TYLENOL) tablet 650 mg     0.9% sodium chloride infusion     oxyCODONE (ROXICODONE) IR half-tab 2.5 mg     insulin glargine (LANTUS) injection 15 Units     metoprolol (TOPROL-XL) 24 hr tablet 25 mg     traZODone (DESYREL) tablet  mg     lidocaine 1 % 1 mL     lidocaine (LMX4) kit     sodium chloride (PF) 0.9% PF flush 3 mL     sodium chloride (PF) 0.9% PF flush 3 mL     Current Outpatient Prescriptions   Medication     metoprolol (TOPROL-XL) 25 MG 24 hr tablet     traZODone (DESYREL) 50 MG tablet     HYDROcodone-acetaminophen (NORCO) 5-325 MG per tablet     SERTRALINE HCL PO     LOSARTAN POTASSIUM PO     insulin glargine (LANTUS) 100 UNIT/ML injection     Lancets Thin MISC     glucose blood VI test strips (ONE TOUCH TEST STRIPS) strip     BLOOD GLUCOSE TEST STRIPS STRP     GLUCOSE CONTROL VI LIQD

## 2017-01-27 NOTE — PHARMACY-ADMISSION MEDICATION HISTORY
Admission medication history interview status for this patient is complete. See [facility] admission navigator for allergy information, prior to admission medications and immunization status.     Medication history interview source(s):Patient's daughter manages medications  Medication history resources (including written lists, pill bottles, clinic record):None  Primary pharmacy: [facility]    Changes made to PTA medication list:  Added: trazodone prn  Deleted: iron tabs, vitamin d  Changed: metoprolol 50mg --> 25mg, added frequency to losartan    Actions taken by pharmacist (provider contacted, etc):None     Additional medication history information:None    Medication reconciliation/reorder completed by provider prior to medication history? Yes    For patients on insulin therapy: Yes  Lantus 30 units qhs  Sliding scale Novolog N  -- pt was on short-acting insulin in the past but this was dc'd by PCP d/t age & risk of hypoglycemia   Patients eat three meals a day:   Y, usually eats small amounts/snacks througout the day. Eats lots of sugar & carbs at home per daughter   Any Barriers to therapy:  patient has dementia, daughter manages        Prior to Admission medications    Medication Sig Last Dose Taking? Auth Provider   metoprolol (TOPROL-XL) 25 MG 24 hr tablet Take 25 mg by mouth every evening 1/25/2017 at pm Yes Unknown, Entered By History   traZODone (DESYREL) 50 MG tablet Take  mg by mouth nightly as needed (manic episodes) Past Month at Unknown time Yes Unknown, Entered By History   SERTRALINE HCL PO Take 50 mg by mouth every evening  1/25/2017 at pm Yes Reported, Patient   LOSARTAN POTASSIUM PO Take 100 mg by mouth every evening  1/25/2017 at pm Yes Reported, Patient   insulin glargine (LANTUS) 100 UNIT/ML injection Inject 30 Units Subcutaneous At Bedtime.   1/25/2017 at PM Yes Reported, Patient   Lancets Thin MISC 1 Units daily. Unknown at Unknown time  Jarod Mcguire MD   glucose blood VI test strips (ONE  TOUCH TEST STRIPS) strip by In Vitro route. Test as directed Unknown at Unknown time  Jarod Mcguire MD   BLOOD GLUCOSE TEST STRIPS STRP for one touch ultra meter Unknown at Unknown time  Weiler, Karen, MD   GLUCOSE CONTROL VI LIQD for one touch ultra Unknown at Unknown time  Weiler, Karen, MD

## 2017-01-27 NOTE — DISCHARGE INSTRUCTIONS
Discharge Instructions  Trauma    You were seen today for an injury due to some kind of trauma (crash, fall, etc.).  Some injuries may not show up until after you leave the Emergency Department.  It is important that you pay attention to these instructions and follow-up with your regular doctor as instructed.    Return to the Emergency Department right away if:    You have abdominal pain or bruises, chest pain, pain in a new area, or pain that is getting worse.    You get short of breath.    You develop a fever over 101 degrees.    You have weakness in your arms or legs.    You faint or you are very lightheaded.    You have any new symptoms, you are feeling weak or unusually ill, or something worries you.     Injuries to the brain are possible with any accident.  Return right away if you have confusion, vomiting more than once, difficulty walking or a headache that is getting worse. Bring a child or a person who can t talk back if they seem to be behaving in an abnormal way.      MORE INFORMATION:    General Injuries:    Aches and pains are usually worse the day after your accident, but should not be severe, and should start getting better after that. Aches and pains are common in the neck and back.    Injuries from your accident may prevent you from working.  Follow-up with your regular doctor to get a work note and to find out how long you will not be able to work.    Pain medications or your injuries may make it unsafe for you to drive or operate machinery.    Use ice to injured areas for the first one or two days. Apply a bag of ice wrapped in a cloth for about 15 minutes at a time. You can do this as often as once an hour. Do not sleep with an ice pack, since it can burn you.     You can use non-prescription pain medicine, like Tylenol  (acetaminophen), Advil  (ibuprofen), Motrin  (ibuprofen), Nuprin  (ibuprofen) if your emergency doctor or your own doctor told you this is okay. Tylenol  (acetaminophen) is in  many prescription medicines and non-prescription medicines--check all of your medicines to be sure you aren t taking more than 3000 mg per day.    Limit your activity for at least one or two days. Avoid doing things that hurt.    You need to see your doctor if any injured area is not back to normal in 1 week.    Car Accident:    If you have been on a backboard or had a neck collar on, this may make you stiff and sore.  This should get better in 1-2 days.  Return to the Emergency Department if the pain or discomfort is severe or gets worse.    Be careful of shards of glass on your body or in your belongings.    Fractures, Sprains, and Strains:    Return to the Emergency Department right away if your injured area gets more painful, if the splint or dressing seems to be too tight, if it gets numb or tingly past the injury, or if the area past the injury gets pale, blue, or cold.    Use your crutches if you were given them today. Don t put weight on the injured area until the pain is gone.    Keep the injured area above the level of your heart while laying or sitting down.  This well help lessen the swelling (puffiness) and the pain.    You may use an elastic bandage (Ace  Wrap) if it makes you more comfortable. Wrap it just tight enough to provide mild compression, and loosen it if you get swelling past the bandage.    Note about X-rays: If you had x-rays done today, they were read by your emergency physician. They will also be read later by a radiologist. We will contact you if the radiologist thinks they show something different than the emergency physician did. Remember that there are some fractures (breaks in the bone) that can t be seen right away. Even if your x-rays today were normal, you must see your doctor in clinic to re-check.     Splints:    A splint put on in the Emergency Department is temporary. Your regular doctor or orthopedic doctor will remove it, and replace it with a cast or boot if  needed.    Keep the splint dry. Cover it with a plastic bag when you wash. Even with a plastic bag, you still can t get in water or let water get right on it. If it does get wet, you should come back or see your doctor to have it replaced.    Do not put objects inside the splint to scratch.    If there is an elastic bandage (Ace  Wrap) holding the splint on this may be loosened a little to relieve pressure or pain.  If pain continues return to the Emergency Department right away.    Return if the splint starts cutting into your skin.    Do not remove your splint by yourself unless told to by your doctor. You can t take it off and put it back on again.     Wounds:    Infections can follow many injuries. Watch for fevers, redness spreading from the wound, pus or stitches that open up. Return here or see your doctor if these happen.    There can always be glass, wood, dirt or other things in any wound.  They won t always show up even on x-rays.  If a wound doesn t heal, this may be why, and it is important to follow-up with your regular doctor. Small pieces of glass or other materials may work their way out on their own.    Cuts or scrapes may start to bleed after leaving the Emergency Department.  If this happens, hold pressure on the bleeding area with a clean cloth or put pressure over the bandage.  If the bleeding doesn t stop after you use constant pressure for   hour, you should return to the Emergency Department for further treatment.    Any bandage or dressing put on here should be removed in 12-24 hours, or as your doctor instructs. Remove the dressing sooner if it seems too tight or painful, or if it is getting numb, tingly, or pale past the dressing.    After you take off the dressing, wash the cut or scrape with soap and water once or twice a day.    Apply ointment like Bacitracin  (polypeptide antibiotic) to scrapes or cuts, and keep them covered with a Band-Aid  or gauze if possible, until they heal up or  until your stitches are taken out.    Dermabond  or Steri-Strips  should be left alone and will come off by themselves.  Dissolving stitches should go away or fall out within about a week.    Regular stitches need to be taken out by your doctor in clinic.  Call today and schedule an appointment.  Leave your stitches in for as long as you were told today.    Most injuries are preventable!  As your local emergency physicians, we encourage you to:    Wear your seat belt.    Do not talk on your cell phone while driving.    Do not read or send text messages while driving.    Wear a bike or motorcycle helmet.    Wear a helmet while skiing and snowboarding.    Wear personal flotation devices at all times while on the water.    Always have your child in a car seat.    Do not allow children less than 12 years old to ride in the front seat.    Go to the CDC website to find more information on preventing injures:  http://www.cdc.gov/injury/index.html    If you were given a prescription for medicine here today, be sure to read all of the information (including the package insert) that comes with your prescription.  This will include important information about the medicine, its side effects, and any warnings that you need to know about.  The pharmacist who fills the prescription can provide more information and answer questions you may have about the medicine.  If you have questions or concerns that the pharmacist cannot address, please call or return to the Emergency Department.     Opioid Medication Information    Pain medications are among the most commonly prescribed medicines, so we are including this information for all our patients. If you did not receive pain medication or get a prescription for pain medicine, you can ignore it.     You may have been given a prescription for an opioid (narcotic) pain medicine and/or have received a pain medicine while here in the Emergency Department. These medicines can make you drowsy  or impaired. You must not drive, operate dangerous equipment, or engage in any other dangerous activities while taking these medications. If you drive while taking these medications, you could be arrested for DUI, or driving under the influence. Do not drink any alcohol while you are taking these medications.     Opioid pain medications can cause addiction. If you have a history of chemical dependency of any type, you are at a higher risk of becoming addicted to pain medications.  Only take these prescribed medications to treat your pain when all other options have been tried. Take it for as short a time and as few doses as possible. Store your pain pills in a secure place, as they are frequently stolen and provide a dangerous opportunity for children or visitors in your house to start abusing these powerful medications. We will not replace any lost or stolen medicine.  As soon as your pain is better, you should flush all your remaining medication.     Many prescription pain medications contain Tylenol  (acetaminophen), including Vicodin , Tylenol #3 , Norco , Lortab , and Percocet .  You should not take any extra pills of Tylenol  if you are using these prescription medications or you can get very sick.  Do not ever take more than 3000 mg of acetaminophen in any 24 hour period.    All opioids tend to cause constipation. Drink plenty of water and eat foods that have a lot of fiber, such as fruits, vegetables, prune juice, apple juice and high fiber cereal.  Take a laxative if you don t move your bowels at least every other day. Miralax , Milk of Magnesia, Colace , or Senna  can be used to keep you regular.      Remember that you can always come back to the Emergency Department if you are not able to see your regular doctor in the amount of time listed above, if you get any new symptoms, or if there is anything that worries you.      Discharge Instructions  Incidental Findings    An incidental finding is something  unexpected that was found while you were being treated today.  While this finding is not an emergency, you will want to follow up with your primary doctor to determine if anything should be done about it.    These findings can come from:    Checking your vital signs (example: high blood pressure).    Taking your history (example: unexplained weight loss).    The physical exam (example: a heart murmur).    Laboratory study (example: anemia).    X-rays/ultrasound/CT or other imaging (example: an unexplained mass).    Return to the Emergency Department if:    Your condition worsens.    You develop unexpected pain.    You now develop new symptoms or have new concerns.    Follow up with your doctor:    Follow up within the next week to discuss the results of all of your tests and about the main reason for your visit today.    Inform your doctor of what testing you had done today. You may want to obtain copies of your results from the medical records department.    Modern medical technology has become very sensitive.  Unexpected or incidental findings are very common and do not always indicate a problem.  However, sometimes problems are found very early before symptoms have even started. While these findings are not an emergency, this may allow your primary care doctor to start the earliest treatment possible. Sometimes these incidental findings are not discovered until later when the final report is reported or when your primary care doctor compares our finding to your previous studies. Therefore, it is important for you to always review all results and studies with your primary care doctor or clinic after an Emergency Department visit.  If you were given a prescription for medicine here today, be sure to read all of the information (including the package insert) that comes with your prescription.  This will include important information about the medicine, its side effects, and any warnings that you need to know about.   The pharmacist who fills the prescription can provide more information and answer questions you may have about the medicine.  If you have questions or concerns that the pharmacist cannot address, please call or return to the Emergency Department.   Remember that you can always come back to the Emergency Department if you are not able to see your regular doctor in the amount of time listed above, if you get any new symptoms, or if there is anything that worries you.

## 2017-01-27 NOTE — PLAN OF CARE
Problem: Goal Outcome Summary  Goal: Goal Outcome Summary  PT per chart review and discussion with care team and pt's dtr, dtr wants to take pt home where she cares for her and hires additional help. Pt has been very lethargic and not completed much mob, nursing will complete mob with pt with dtr present for dtr to see A required.  If additional A needed beyond dtr, dtr will check with hired help to A in getting pt in home, dtr currently declining PT needs here or at home and is in agreement to plan above.  Rn aware.

## 2017-01-27 NOTE — ED NOTES
Pt presents to ED with daughter who reports on Tuesday pt was with care giver who said pt lost her balance and fell between the couch and coffee table, daughter states today pts pain is much worse, on right shoulder and across chest and neck. Pt has hx of dementia. ABCs intact. Pt uses a walker sometime

## 2017-01-27 NOTE — CONSULTS
D:  Discharge planning  I/A:  Met with pt's dtrashok.   Pt lives with her and she hires caregivers to take care of pt when she is gone.  She states they have no needs.  P:  No needs identified.  Dtr does not want SW involvement at this time.

## 2017-01-27 NOTE — ED PROVIDER NOTES
History     Chief Complaint:  Fall    HPI   History provided by patient's daughter secondary to her baseline dementia.    Nuria Marinelli is a 89 year old female who presents with fall. The patient currently ambulates unassisted at baseline with occasional walker use and has a caregiver at her residence, where she lives with her daughter, that helps her at home. The patient was in her home two days ago when she accidentally tripped over a coffee table, causing her to fall forward onto the coffee table. This was witnessed by the caregiver who reported attempting to help catch her but they were unable to stop the patient from hitting the coffee table. She did not lose consciousness and was able to continue walking at home, complaining of some pain in her chest and right shoulder. She took Tylenol for pain (with the last dose of 1100) but today she began complaining of right sided neck pain and this prompted the daughter to bring the patient here for evaluation. Here, the patient complains of some anterior right sided chest pain, neck pain, and shoulder pain. There was some mention of leg pain by the patient yesterday but she does not localize it today. She denies any headache. She is not currently anticoagulated. She denies any sore throat or cough.     Allergies:   Macrobid - GI disturbance      Medications:    Dutoprol  Sertraline  Losartan  Lantus  Toprol-XL    Past Medical History:    CKD   Type 2 diabetes  Hyperlipidemia  Major depressive disorder  Diaphragmatic hernia  Hypertension  Osteoporosis  GERD  Anemia  Urethral polyp  Major depressive disorder    Past Surgical History:    Bilateral cataract surgery  FRANCISCO  Appendectomy  Cholecystectomy  T&A  Right breast surgery  Hemorrhoidectomy    Family History:    CAD, CVD, Alzheimer's disease     Social History:  Smoking status: Never smoker  Alcohol use: No   Marital Status:     Patient presents with daughter whom she lives with.     Review of Systems    Respiratory: Negative for shortness of breath.    Cardiovascular: Positive for chest pain.   Musculoskeletal: Positive for myalgias and neck pain. Negative for back pain.   Neurological: Negative for syncope and headaches.   ROS otherwise limited due to dementia.    Physical Exam     Patient Vitals for the past 24 hrs:   BP Temp Temp src Pulse Resp SpO2 Weight   01/26/17 1804 - - - - - - 67.586 kg (149 lb)   01/26/17 1803 (!) 190/91 mmHg 97.8  F (36.6  C) Oral 59 18 99 % -      Physical Exam  General: The patient is alert, in no respiratory distress.    HENT: Mucous membranes moist.    Cardiovascular: Regular rate and rhythm. Good pulses in all four extremities. Normal capillary refill and skin turgor.     Respiratory: Lungs are clear. No nasal flaring. No retractions. No wheezing, no crackles.    Gastrointestinal: Abdomen soft. No guarding, no rebound. No palpable hernias.     Musculoskeletal: No gross deformity. Exquisite right chest wall tenderness. No crepitus.    Skin: No rashes or petechiae.     Neurologic: GCS 15. No testable cranial nerve deficit. Follows commands with clear and appropriate speech. Gives appropriate answers. Good strength in all extremities. No gross neurologic deficit. Gross sensation intact. Pupils are round and reactive. No meningismus.     Lymphatic: No cervical adenopathy. No lower extremity swelling.    Psychiatric: The patient is non-tearful.     Emergency Department Course     Imaging:  Radiographic findings were communicated with the patient who voiced understanding of the findings.     XR Chest 1 View  Borderline cardiomegaly is stable. Moderate-sized  esophageal hiatal hernia. No acute infiltrates or pneumothorax.  Surgical clips right axillary region.     Shoulder XR, G/E 3 views, right  On the AP view there is a vague rounded lucency in the  right humeral neck measuring approximately 1.8 cm. This is not  convincingly seen on other views and may be artifactual. Otherwise  no  fracture or dislocation. Mild degenerative changes right shoulder.  Surgical clips right axillary region.     Pelvis XR, 1-2 views  No fractures or other acute findings. Degenerative changes  lower lumbar spine. Small benign calcification adjacent to the left  innominate bone.     CT Head w/o Contrast  No evidence for intracranial hemorrhage or any acute  process. Chronic changes similar to prior exam noted.  MATTHEW MONAHAN MD    CT Cervical Spine w/o Contrast  Degenerative changes. No evidence for any fracture.  MATTHEW MONAHAN MD    Laboratory:  1847 - Troponin: <0.015   CBC: WNL (WBC 9.7, HGB 12.7, )    BMP:Glucose 196 (H), Creatinine 1.27. High   INR: 1.10    Emergency Department Course:  Past medical records, nursing notes, and vitals reviewed.  1803: I performed an exam of the patient and obtained history, as documented above. The patient was placed in a hard collar here.  The patient was sent for CT-scan and X-ray while in the emergency department, findings above.     2112: I rechecked the patient and explained findings. Findings and plan explained to the Patient and family who consents to admission.     2206: Discussed the patient with Dr. Gutierrez, who will admit the patient to a observation bed for further monitoring, evaluation, and treatment.       Impression & Plan      Medical Decision Making:  The patient has a history of dementia and therefore cannot provide full history but her daughter did a very good job. The patient apparently had fallen and sounded like she had lost her balance. Her aid tried to catch her but was not able and the patient s chest hit the coffee table. The patient does have exquisite tenderness although there was no crepitus. However, I did consider the possibility of pneumothorax or pulmonary contusion and ordered X-rays as well labs. The patient was originally going to try to go home but due to significant pain, she required admission to the hospital. She was admitted in  good condition. I did not feel there was likely any intracranial or intraabdominal injury.    Diagnosis:    ICD-10-CM   1. Fall, initial encounter W19.XXXA   2. Chest wall injury, initial encounter S29.9XXA   3. Other secondary hypertension I15.8     Discharge Medications:  New Prescriptions    HYDROCODONE-ACETAMINOPHEN (NORCO) 5-325 MG PER TABLET    Take 1 tablet by mouth 2 times daily as needed for moderate to severe pain     Gianni Gann  1/26/2017   [facility] EMERGENCY DEPARTMENT    I, Gianni Gann, am serving as a scribe at 6:03 PM on 1/26/2017 to document services personally performed by Roderick Barba MD based on my observations and the provider's statements to me.      Roderick Barba MD  01/27/17 0049

## 2017-01-27 NOTE — H&P
[facility]         Hospitalist Observation Admission Note    Name: Nuria Marinelli    MRN: [id number]          YOB: 1925    Age: 89 year old    Date of admission: 1/26/2017    Primary care provider: Kimani Fletcher  Admitting physician:Janes Gutierrez               Assessment        Nuria Marinelli is a 89 year old  female with a significant past medical history of insulin requiring diabetes, dementia, depression, hyperlipidemia who presents with fall a few days ago associated with chest pain and weakness.  Since her fall she has been having unsteady gait, decreased appetite and complaining of pain.  Patient was seen in the emergency room and attempted to discharge patient from the ED was not successful due to pain control issues.    #1.  Mechanical fall a few days ago  #2.  Chest wall pain without fracture fracture or internal injury    Reason for admission:    Observation admission for monitoring and management of pain     Observation Goals:  --Pain controlled  --Patient able to ambulate           Comorbid conditions:    Type 2 diabetes insulin requiring, hypertension, major depression           Plan     > Admission Status:Admit to observation     >Care plan:  -- Pain medication as needed, monitor overnight, physical therapy assessment for safety discharge plan tomorrow    >Supportive care:IV fluids continued    >Diet:Diet advanced    >Activity:Advance activity as tolerated    >Education/Counseling :Discussed treatment plan with the patient    >Consults:Inpatient consult with PT    >VTE prophylactic measures:prophylaxis against venous thromboembolism    >Therapies:Physical therapy      >Additional orders    -- We will hold patient's Lantus due to decreased intake and start her on sliding scale insulin  --Care plan discussed with the patient/family and agreed to care plan   --Patient will be transferred to care of hospitalist attending for further evaluation and  management as appropriate   --Old medical orders reviewed   --imaging result independently reviewed by me     (See orders placed for this visit by me )     - Home medication reviewed and will be continued as appropriate once pharmacy reconciliation is completed             Code Status:     DNR / DNI         Disposition:       >anticipate discharge to home with home care services and Anticipate discharge tomorrow            Disclaimer: This note consists of symbols derived from keyboarding, dictation and/or voice recognition software. As a result, there may be errors in the script that have gone undetected. Please consider this when interpreting information found in this chart.             Chief Complaint:   Unsteady gait and pain       History is obtained from the patient's daughter          History of Present Illness:   This patient is a 89 year old  female with a significant past medical history of recent fall who presents with the following condition requiring a hospital admission:    Unsteady gait and pain involving her right chest as well as shoulder region after a fall.    Patient is a 89-year-old female who lives with her daughter and has been doing okay until last Tuesday when she fell after she lost her balance between the collagen coffee table.  Patient did not lose consciousness but she remained weak and has been complaining of right shoulder pain and right-sided chest pain and neck pain.  She was on unsteady and gradually getting weak.  Patient was seen in the emergency room and skeletal survey did not reveal any evidence of any fractures or major pathology and patient was given pain medications and attempt to discharge the patient from the ED was not successful due to patient's complaint of pain and daughters concern for safety at home.            Past Medical History:     Past Medical History   Diagnosis Date     Esophageal reflux      with hiatal hernia, gastric polyps     Type 2 diabetes, HbA1C goal  < 8% (H) 1996     Essential hypertension, benign 1992     Anemia, unspecified 2001     iron def     Generalized osteoarthrosis, unspecified site      Other osteoporosis      osteopenia -2.2     Allergic rhinitis due to other allergen      Other specified forms of hearing loss      Unspecified hemorrhoids without mention of complication      Malignant neoplasm of other specified sites of female breast 1996     Breast CA     Hernia of unspecified site of abdominal cavity without mention of obstruction or gangrene      Anemia, unspecified      IRON DEFICIENCY     Asymptomatic postmenopausal status (age-related) (natural)      Unspecified cataract      URETHRAL POLYP      Hyperlipidemia LDL goal <100      Diverticulitis of colon (without mention of hemorrhage) 2006     moderate     Major depressive disorder, single episode, mild (H)      resolved     Ulcer, gastrojejunal 12/08     CKD (chronic kidney disease) stage 3, GFR 30-59 ml/min             Past Surgical History:     Past Surgical History   Procedure Laterality Date     C remv cataract intracap,insert lens  2004     LT     C remv cataract intracap,insert lens  2005     RT     C total abdom hysterectomy  1975     ovaries in ?     C appendectomy  1975     C removal gallbladder  1976     Cholecystectomy     Hc remove tonsils/adenoids,<11 y/o  1926     T & A <12y.o.     Surgical history of -   1996     Rt Breast     Hc colonoscopy thru stoma, diagnostic  11/06     moderate diverticulitis - Dr shipman     Hc esophagoscopy, diagnostic  11/06, 3/09     tortuous hiatal hernia - gastric polyps     Surgical history of -   2002     hemmorhoid surgery      esophagoscopy, diagnostic  12/08     ulcer             Social History:     Social History   Substance Use Topics     Smoking status: Never Smoker      Smokeless tobacco: Not on file     Alcohol Use: No             Family History:     Family History   Problem Relation Age of Onset     C.A.D. Father      age 86     C.A.D.  Mother      age CHF     CEREBROVASCULAR DISEASE Sister      age 79     Alzheimer Disease Brother      Sal's Crenzfeldt     Alzheimer Disease Sister      Dementia/Parkinsons/Minimal CVA            Allergies:     Allergies   Allergen Reactions     Macrobid [Nitrofurantoin] GI Disturbance             Medications:         Prior to Admission medications    Medication Sig Last Dose Taking? Auth Provider   HYDROcodone-acetaminophen (NORCO) 5-325 MG per tablet Take 1 tablet by mouth 2 times daily as needed for moderate to severe pain  Yes Roderick Barba MD   metoprolol-hydrochlorothiazide (DUTOPROL) 25-12.5 MG TB24 per tablet Take 1 tablet by mouth daily  Yes Reported, Patient   VITAMIN D, CHOLECALCIFEROL, PO Take 2,000 Units by mouth daily  Yes Reported, Patient   SERTRALINE HCL PO Take 50 mg by mouth daily   Yes Reported, Patient   LOSARTAN POTASSIUM PO Take 100 mg by mouth   Yes Reported, Patient   insulin glargine (LANTUS) 100 UNIT/ML injection Inject 30 Units Subcutaneous At Bedtime.    Yes Reported, Patient   metoprolol (TOPROL-XL) 50 MG 24 hr tablet Take 1 tablet by mouth daily.  Yes Jarod Mcguire MD   Lancets Thin MISC 1 Units daily.  Yes Jarod Mcguire MD   glucose blood VI test strips (ONE TOUCH TEST STRIPS) strip by In Vitro route. Test as directed  Yes Jarod Mcguire MD   IRON 325 (65 FE) MG OR TABS 1 TABLET DAILY  Yes Jarod Mcguire MD   BLOOD GLUCOSE TEST STRIPS STRP for one touch ultra meter  Yes Weiler, Karen, MD   GLUCOSE CONTROL VI LIQD for one touch ultra  Yes Weiler, Karen, MD          Review of Systems:     A Comprehensive greater than 10 system review of systems was carried out.  Pertinent positives and negatives are noted above in HPI.  Otherwise negative for contributory information.              Physical Exam:     Vitals were reviewed    Temp:  [97.8  F (36.6  C)-98.1  F (36.7  C)] 98.1  F (36.7  C)  Pulse:  [59] 59  Heart Rate:  [61] 61  Resp:  [16-18] 16  BP: (136-194)/(50-95) 137/50  "mmHg  SpO2:  [93 %-99 %] 94 %        BP Readings from Last 1 Encounters:   01/27/17 137/50      Pulse Readings from Last 1 Encounters:   01/26/17 59      Resp Readings from Last 1 Encounters:   01/27/17 16      Temp Readings from Last 1 Encounters:   01/27/17 98.1  F (36.7  C) Oral      SpO2 Readings from Last 1 Encounters:   01/27/17 94%      Wt Readings from Last 1 Encounters:   01/26/17 67.586 kg (149 lb)      Ht Readings from Last 1 Encounters:   07/03/12 1.499 m (4' 11\")       GEN:  Alert, appears comfortable, NAD.  NECK:Supple ,no mass or thyromegaly   HEENT:  Normocephalic/atraumatic, no scleral icterus, no nasal discharge, mouth moist.  CV:  Regular rate and rhythm, no murmur or JVD.  S1 + S2 noted, no S3 or S4.  LUNGS:  Clear to auscultation bilaterally without rales/rhonchi/wheezing/retractions.  Symmetric chest rise on inhalation noted.  ABD:  Active bowel sounds, soft, non-tender/non-distended.  No rebound/guarding/rigidity.  EXT:  No edema.  No cyanosis.  No joint synovitis noted.  LGS: No cervical or axillary lymphadenopathy   SKIN:  Dry to touch, no exanthems noted in the visualized areas.  Patient has right chest wall tenderness  Neurologic:Grossly intact,non focal . No acute focal neurologic deficit   Psychaitric exam: Mood and affect normal                          Data:       All laboratory and imaging data in the past 24 hours reviewed     Results for orders placed or performed during the hospital encounter of 01/26/17   CT Head w/o Contrast    Narrative    CT SCAN OF THE HEAD WITHOUT CONTRAST   1/26/2017  7:37 PM     HISTORY: Trauma    TECHNIQUE:  Axial images of the head and coronal reformations without  IV contrast material.  Radiation dose for this scan was reduced using  automated exposure control, adjustment of the mA and/or kV according  to patient size, or iterative reconstruction technique.    COMPARISON: 10/11/2016.    FINDINGS: Mild to moderate cerebral atrophy is present. There " are  patchy white matter changes in both hemispheres without mass effect.  There is also cerebellar atrophy. There is no evidence for  intracranial hemorrhage, mass effect, acute infarct, or skull  fracture. There is some chronic opacification in the right maxillary  sinus.      Impression    IMPRESSION: No evidence for intracranial hemorrhage or any acute  process. Chronic changes similar to prior exam noted.    MATTHEW MONAHAN MD   CT Cervical Spine w/o Contrast    Narrative    CT CERVICAL SPINE WITHOUT CONTRAST   1/26/2017  7:38 PM     HISTORY: Pain     TECHNIQUE: Axial images of the cervical spine were obtained without  intravenous contrast. Multiplanar reformations were performed.  Radiation dose for this scan was reduced using automated exposure  control, adjustment of the mA and/or kV according to patient size, or  iterative reconstruction technique.     COMPARISON: 10/11/2016    FINDINGS: Sagittal reconstructions demonstrate minimal degenerative  spondylolisthesis of C4 on C5, otherwise normal posterior alignment.  There is no evidence for fracture. Mild-to-moderate multilevel  degenerative disc and facet disease is present. Degenerative changes  are most advanced at the C5-C6 level where there is moderate central  canal stenosis and some mild cord deformity. This is similar to that  seen on the prior exam.      Impression    IMPRESSION: Degenerative changes. No evidence for any fracture.    MATTHEW MONAHAN MD   Pelvis XR, 1-2 views    Narrative    PELVIS ONE TO TWO VIEWS   1/26/2017 7:53 PM     INDICATION: Pain, fall.    COMPARISON: None.      Impression    IMPRESSION: No fractures or other acute findings. Degenerative changes  lower lumbar spine. Small benign calcification adjacent to the left  innominate bone.     PATI SAMANO MD   Shoulder XR, G/E 3 views, right    Narrative    RIGHT SHOULDER THREE OR MORE VIEWS   1/26/2017 7:53 PM     INDICATION: Pain.    COMPARISON: None.      Impression    IMPRESSION:  On the AP view there is a vague rounded lucency in the  right humeral neck measuring approximately 1.8 cm. This is not  convincingly seen on other views and may be artifactual. Otherwise no  fracture or dislocation. Mild degenerative changes right shoulder.  Surgical clips right axillary region.     PATI SAMANO MD   XR Chest 1 View    Narrative    CHEST ONE VIEW   1/26/2017 7:52 PM     INDICATION: Chest pain after fall.    COMPARISON: 6/16/2015.      Impression    IMPRESSION: Borderline cardiomegaly is stable. Moderate-sized  esophageal hiatal hernia. No acute infiltrates or pneumothorax.  Surgical clips right axillary region.     PATI SAMANO MD   CBC with platelets differential   Result Value Ref Range    WBC 9.7 4.0 - 11.0 10e9/L    RBC Count 4.33 3.8 - 5.2 10e12/L    Hemoglobin 12.7 11.7 - 15.7 g/dL    Hematocrit 38.3 35.0 - 47.0 %    MCV 89 78 - 100 fl    MCH 29.3 26.5 - 33.0 pg    MCHC 33.2 31.5 - 36.5 g/dL    RDW 14.1 10.0 - 15.0 %    Platelet Count 316 150 - 450 10e9/L    Diff Method Automated Method     % Neutrophils 83.4 %    % Lymphocytes 5.6 %    % Monocytes 9.2 %    % Eosinophils 0.5 %    % Basophils 0.4 %    % Immature Granulocytes 0.9 %    Nucleated RBCs 0 0 /100    Absolute Neutrophil 8.1 1.6 - 8.3 10e9/L    Absolute Lymphocytes 0.6 (L) 0.8 - 5.3 10e9/L    Absolute Monocytes 0.9 0.0 - 1.3 10e9/L    Absolute Eosinophils 0.1 0.0 - 0.7 10e9/L    Absolute Basophils 0.0 0.0 - 0.2 10e9/L    Abs Immature Granulocytes 0.1 0 - 0.4 10e9/L    Absolute Nucleated RBC 0.0    INR   Result Value Ref Range    INR 1.10 0.86 - 1.14   Troponin I   Result Value Ref Range    Troponin I ES  0.000 - 0.045 ug/L     <0.015  The 99th percentile for upper reference range is 0.045 ug/L.  Troponin values in   the range of 0.045 - 0.120 ug/L may be associated with risks of adverse   clinical events.     Basic metabolic panel   Result Value Ref Range    Sodium 138 133 - 144 mmol/L    Potassium 4.4 3.4 - 5.3 mmol/L     Chloride 104 94 - 109 mmol/L    Carbon Dioxide 23 20 - 32 mmol/L    Anion Gap 11 3 - 14 mmol/L    Glucose 196 (H) 70 - 99 mg/dL    Urea Nitrogen 23 7 - 30 mg/dL    Creatinine 1.27 (H) 0.52 - 1.04 mg/dL    GFR Estimate 39 (L) >60 mL/min/1.7m2    GFR Estimate If Black 48 (L) >60 mL/min/1.7m2    Calcium 8.8 8.5 - 10.1 mg/dL   EKG 12-lead, tracing only   Result Value Ref Range    Interpretation ECG Click View Image link to view waveform and result             Recent Results (from the past 48 hour(s))   CT Head w/o Contrast    Narrative    CT SCAN OF THE HEAD WITHOUT CONTRAST   1/26/2017  7:37 PM     HISTORY: Trauma    TECHNIQUE:  Axial images of the head and coronal reformations without  IV contrast material.  Radiation dose for this scan was reduced using  automated exposure control, adjustment of the mA and/or kV according  to patient size, or iterative reconstruction technique.    COMPARISON: 10/11/2016.    FINDINGS: Mild to moderate cerebral atrophy is present. There are  patchy white matter changes in both hemispheres without mass effect.  There is also cerebellar atrophy. There is no evidence for  intracranial hemorrhage, mass effect, acute infarct, or skull  fracture. There is some chronic opacification in the right maxillary  sinus.      Impression    IMPRESSION: No evidence for intracranial hemorrhage or any acute  process. Chronic changes similar to prior exam noted.    MATTHEW MONAHAN MD   CT Cervical Spine w/o Contrast    Narrative    CT CERVICAL SPINE WITHOUT CONTRAST   1/26/2017  7:38 PM     HISTORY: Pain     TECHNIQUE: Axial images of the cervical spine were obtained without  intravenous contrast. Multiplanar reformations were performed.  Radiation dose for this scan was reduced using automated exposure  control, adjustment of the mA and/or kV according to patient size, or  iterative reconstruction technique.     COMPARISON: 10/11/2016    FINDINGS: Sagittal reconstructions demonstrate minimal  degenerative  spondylolisthesis of C4 on C5, otherwise normal posterior alignment.  There is no evidence for fracture. Mild-to-moderate multilevel  degenerative disc and facet disease is present. Degenerative changes  are most advanced at the C5-C6 level where there is moderate central  canal stenosis and some mild cord deformity. This is similar to that  seen on the prior exam.      Impression    IMPRESSION: Degenerative changes. No evidence for any fracture.    MATTHEW MONAHAN MD   XR Chest 1 View    Narrative    CHEST ONE VIEW   1/26/2017 7:52 PM     INDICATION: Chest pain after fall.    COMPARISON: 6/16/2015.      Impression    IMPRESSION: Borderline cardiomegaly is stable. Moderate-sized  esophageal hiatal hernia. No acute infiltrates or pneumothorax.  Surgical clips right axillary region.     PATI SAMANO MD   Pelvis XR, 1-2 views    Narrative    PELVIS ONE TO TWO VIEWS   1/26/2017 7:53 PM     INDICATION: Pain, fall.    COMPARISON: None.      Impression    IMPRESSION: No fractures or other acute findings. Degenerative changes  lower lumbar spine. Small benign calcification adjacent to the left  innominate bone.     PATI SAMANO MD   Shoulder XR, G/E 3 views, right    Narrative    RIGHT SHOULDER THREE OR MORE VIEWS   1/26/2017 7:53 PM     INDICATION: Pain.    COMPARISON: None.      Impression    IMPRESSION: On the AP view there is a vague rounded lucency in the  right humeral neck measuring approximately 1.8 cm. This is not  convincingly seen on other views and may be artifactual. Otherwise no  fracture or dislocation. Mild degenerative changes right shoulder.  Surgical clips right axillary region.     PATI SAMANO MD       EKG results: Sinus bradycardia with rate of 56, first degree AV block             All imaging studies reviewed by me.       Patient`s old medical records reviewed and case discussed with the ED physician.    ED course-Reviewed

## 2017-01-27 NOTE — ED NOTES
RN assisted with changing pt's Depends and performed franci care. Pt reconnected to vitals monitor. ABCs intact.

## 2017-01-27 NOTE — ED NOTES
PRIMARY DIAGNOSIS: /right chest pain/generalized weakness  OUTPATIENT/OBSERVATION GOALS TO BE MET BEFORE DISCHARGE:    1. Tolerate PO Intake: Yes  2. Cleared for discharge from consultants involved: No  3. ADLs back to baseline?  No,pt has generalized weakness and needs assist of one ,pt is normally independent at home.  4. Activity and level of assistance: Up with assist of one. Consider SW and/or PT evaluation.   5. Pain status:c/o right chest pain with movement  6. Barriers to discharge noted Yes, pt has PT   Is patient a falls risk? Yes  Reason for falls risk:  Mobility  Falls armband on?  YES,   Within Arm's Reach? Yes   Bed alarm turned on?   YES,   Personal alarm in place and turned on?   YES,

## 2017-01-27 NOTE — ED NOTES
Pt. up with assist of one and walker.pt walked about 250 ft ,tolerated well.pt c/o right chest pain with movement ,pt moaned aloud ,oxycodone 2.5 mg and tylenol 650 mg given.pt is eating lunch, daughter in the room helping with feeding.

## 2017-01-28 VITALS
DIASTOLIC BLOOD PRESSURE: 55 MMHG | SYSTOLIC BLOOD PRESSURE: 150 MMHG | HEART RATE: 59 BPM | OXYGEN SATURATION: 93 % | TEMPERATURE: 97.6 F | RESPIRATION RATE: 20 BRPM | WEIGHT: 149 LBS | BODY MASS INDEX: 30.08 KG/M2

## 2017-01-28 LAB
ALBUMIN UR-MCNC: NEGATIVE MG/DL
ANION GAP SERPL CALCULATED.3IONS-SCNC: 8 MMOL/L (ref 3–14)
APPEARANCE UR: ABNORMAL
BACTERIA #/AREA URNS HPF: ABNORMAL /HPF
BILIRUB UR QL STRIP: NEGATIVE
BUN SERPL-MCNC: 20 MG/DL (ref 7–30)
CALCIUM SERPL-MCNC: 8.2 MG/DL (ref 8.5–10.1)
CHLORIDE SERPL-SCNC: 112 MMOL/L (ref 94–109)
CO2 SERPL-SCNC: 23 MMOL/L (ref 20–32)
COLOR UR AUTO: YELLOW
CREAT SERPL-MCNC: 1.15 MG/DL (ref 0.52–1.04)
ERYTHROCYTE [DISTWIDTH] IN BLOOD BY AUTOMATED COUNT: 14.6 % (ref 10–15)
GFR SERPL CREATININE-BSD FRML MDRD: 44 ML/MIN/1.7M2
GLUCOSE BLDC GLUCOMTR-MCNC: 129 MG/DL (ref 70–99)
GLUCOSE BLDC GLUCOMTR-MCNC: 136 MG/DL (ref 70–99)
GLUCOSE SERPL-MCNC: 128 MG/DL (ref 70–99)
GLUCOSE UR STRIP-MCNC: 30 MG/DL
HBA1C MFR BLD: 9.9 % (ref 4.3–6)
HCT VFR BLD AUTO: 36 % (ref 35–47)
HGB BLD-MCNC: 11.5 G/DL (ref 11.7–15.7)
HGB UR QL STRIP: NEGATIVE
KETONES UR STRIP-MCNC: NEGATIVE MG/DL
LEUKOCYTE ESTERASE UR QL STRIP: ABNORMAL
MCH RBC QN AUTO: 29 PG (ref 26.5–33)
MCHC RBC AUTO-ENTMCNC: 31.9 G/DL (ref 31.5–36.5)
MCV RBC AUTO: 91 FL (ref 78–100)
MUCOUS THREADS #/AREA URNS LPF: PRESENT /LPF
NITRATE UR QL: NEGATIVE
PH UR STRIP: 5.5 PH (ref 5–7)
PLATELET # BLD AUTO: 258 10E9/L (ref 150–450)
POTASSIUM SERPL-SCNC: 3.9 MMOL/L (ref 3.4–5.3)
RBC # BLD AUTO: 3.97 10E12/L (ref 3.8–5.2)
RBC #/AREA URNS AUTO: 0 /HPF (ref 0–2)
SODIUM SERPL-SCNC: 143 MMOL/L (ref 133–144)
SP GR UR STRIP: 1.01 (ref 1–1.03)
SQUAMOUS #/AREA URNS AUTO: 5 /HPF (ref 0–1)
URN SPEC COLLECT METH UR: ABNORMAL
UROBILINOGEN UR STRIP-MCNC: NORMAL MG/DL (ref 0–2)
WBC # BLD AUTO: 5.5 10E9/L (ref 4–11)
WBC #/AREA URNS AUTO: 94 /HPF (ref 0–2)
YEAST #/AREA URNS HPF: ABNORMAL /HPF

## 2017-01-28 PROCEDURE — 99217 ZZC OBSERVATION CARE DISCHARGE: CPT | Performed by: PHYSICIAN ASSISTANT

## 2017-01-28 PROCEDURE — 83036 HEMOGLOBIN GLYCOSYLATED A1C: CPT | Performed by: INTERNAL MEDICINE

## 2017-01-28 PROCEDURE — 25000132 ZZH RX MED GY IP 250 OP 250 PS 637: Performed by: PHYSICIAN ASSISTANT

## 2017-01-28 PROCEDURE — 25000128 H RX IP 250 OP 636: Performed by: INTERNAL MEDICINE

## 2017-01-28 PROCEDURE — 00000146 ZZHCL STATISTIC GLUCOSE BY METER IP

## 2017-01-28 PROCEDURE — 81001 URINALYSIS AUTO W/SCOPE: CPT | Performed by: PHYSICIAN ASSISTANT

## 2017-01-28 PROCEDURE — 85027 COMPLETE CBC AUTOMATED: CPT | Performed by: INTERNAL MEDICINE

## 2017-01-28 PROCEDURE — 87086 URINE CULTURE/COLONY COUNT: CPT | Performed by: INTERNAL MEDICINE

## 2017-01-28 PROCEDURE — 80048 BASIC METABOLIC PNL TOTAL CA: CPT | Performed by: INTERNAL MEDICINE

## 2017-01-28 PROCEDURE — 87186 SC STD MICRODIL/AGAR DIL: CPT | Performed by: INTERNAL MEDICINE

## 2017-01-28 PROCEDURE — 87088 URINE BACTERIA CULTURE: CPT | Performed by: INTERNAL MEDICINE

## 2017-01-28 PROCEDURE — 25000132 ZZH RX MED GY IP 250 OP 250 PS 637: Mod: GY | Performed by: INTERNAL MEDICINE

## 2017-01-28 PROCEDURE — 96361 HYDRATE IV INFUSION ADD-ON: CPT

## 2017-01-28 PROCEDURE — 36415 COLL VENOUS BLD VENIPUNCTURE: CPT | Performed by: INTERNAL MEDICINE

## 2017-01-28 PROCEDURE — A9270 NON-COVERED ITEM OR SERVICE: HCPCS | Performed by: PHYSICIAN ASSISTANT

## 2017-01-28 PROCEDURE — A9270 NON-COVERED ITEM OR SERVICE: HCPCS | Mod: GY | Performed by: INTERNAL MEDICINE

## 2017-01-28 PROCEDURE — G0378 HOSPITAL OBSERVATION PER HR: HCPCS

## 2017-01-28 RX ORDER — OXYCODONE HYDROCHLORIDE 5 MG/1
2.5 TABLET ORAL
Qty: 6 TABLET | Refills: 0 | Status: SHIPPED | OUTPATIENT
Start: 2017-01-28

## 2017-01-28 RX ORDER — ACETAMINOPHEN 500 MG
500-1000 TABLET ORAL EVERY 6 HOURS PRN
Refills: 0
Start: 2017-01-28

## 2017-01-28 RX ADMIN — ACETAMINOPHEN 650 MG: 325 TABLET, FILM COATED ORAL at 05:25

## 2017-01-28 RX ADMIN — SERTRALINE HYDROCHLORIDE 50 MG: 50 TABLET ORAL at 08:42

## 2017-01-28 RX ADMIN — OXYCODONE HYDROCHLORIDE 2.5 MG: 5 TABLET ORAL at 10:06

## 2017-01-28 RX ADMIN — ACETAMINOPHEN 650 MG: 325 TABLET, FILM COATED ORAL at 10:06

## 2017-01-28 RX ADMIN — OXYCODONE HYDROCHLORIDE 2.5 MG: 5 TABLET ORAL at 05:25

## 2017-01-28 RX ADMIN — SODIUM CHLORIDE: 9 INJECTION, SOLUTION INTRAVENOUS at 03:55

## 2017-01-28 NOTE — ED NOTES
Pt. walked about 250 ft pt c/o being tired and.pt. was wheeled back to her room.pt moaning with movements.oxycodone and tylenol given for pain.

## 2017-01-28 NOTE — ED NOTES
PRIMARY DIAGNOSIS: /right chest pain/generalized weakness/fall  OUTPATIENT/OBSERVATION GOALS TO BE MET BEFORE DISCHARGE:   1. Tolerate PO Intake: Yes   2. Cleared for discharge from consultants involved: N/A   3. ADLs back to baseline? No,pt has generalized weakness and needs assist of one ,pt is normally independent at home.   4. Activity and level of assistance: Up with assist of one and walker.   5. Pain status: Denies pain at this time   6. Barriers to discharge noted No   Is patient a falls risk? Yes Reason for falls risk: Mobility   Falls armband on? YES  Within Arm's Reach? Yes   Bed alarm turned on? YES   Personal alarm in place and turned on? YES  Pt sleeping at this time. Denies pain. VSS. Ax1 with walker when oob. Will continue to monitor.   Pt. will probably discharge home tomorrow with her daughter.

## 2017-01-28 NOTE — DISCHARGE SUMMARY
Discharge Summary  Hospitalist Service    Nuria Marinelli MRN# [id number]   YOB: 1925 Age: 89 year old     Date of Admission:  1/26/2017  Date of Discharge:  1/28/2017  Admitting Physician: Janes Gutierrez MD  Discharge Physician: Gissell Gann PA-C  Discharging Service: Hospitalist Service     Primary Provider: Natali Serrano  Primary Care Physician Phone Number: [phone]         Discharge Diagnoses/Problem Oriented Hospital Course (Providers):    Nuria Marinelli was admitted on 1/26/2017 by Janes Gutierrez MD and I would refer you to their history and physical.  The following problems were addressed during her hospitalization:    1. Pain after fall- Improved with tylenol and low dose oxycodone. Was able to ambulate at baseline and declined home PT.    2. HTN- Restarted Losartan and continued on Metoprolol    3. Acute renal insufficiency- Improved with fluids. Recommend recheck by primary in 1 week.          Code Status:      Full Code        Brief Hospital Stay Summary Sent Home With Patient in AVS:        Reason for your hospital stay       You were admitted for concerns of falling and pain related to that. Your   pain improved with low dose oxcodone and tylenol. At home we recommend you   primarily use the tylenol and if needed small dose oxycodone. If you use   the oxycodone you should take a stool softener.    Your creatinine was a little high here so we held your Losartan while you   were here but your blood pressure is high so we would like you to restart.                                Pending Results:        Unresulted Labs Ordered in the Past 30 Days of this Admission     Date and Time Order Name Status Description    1/28/2017 0610 Urine Culture Aerobic Bacterial Preliminary             Discharge Instructions and Follow-Up:            Follow-up Appointments     Follow-up and recommended labs and tests        Follow up with primary care provider, NATALI SERRANO,  within 7 days to   evaluate medication change and for hospital follow- up.  Repeat creatinine   level and follow up on blood pressure.                      Discharge Disposition:      Discharged to home         Discharge Medications:        Current Discharge Medication List      START taking these medications    Details   acetaminophen (TYLENOL) 500 MG tablet Take 1-2 tablets (500-1,000 mg) by mouth every 6 hours as needed for mild pain  Refills: 0    Associated Diagnoses: Fall, initial encounter      oxyCODONE (ROXICODONE) 5 MG IR tablet Take 0.5 tablets (2.5 mg) by mouth every 3 hours as needed for breakthrough pain or moderate to severe pain  Qty: 6 tablet, Refills: 0    Associated Diagnoses: Fall, initial encounter      HYDROcodone-acetaminophen (NORCO) 5-325 MG per tablet Take 1 tablet by mouth 2 times daily as needed for moderate to severe pain  Qty: 8 tablet, Refills: 0         CONTINUE these medications which have NOT CHANGED    Details   metoprolol (TOPROL-XL) 25 MG 24 hr tablet Take 25 mg by mouth every evening      traZODone (DESYREL) 50 MG tablet Take  mg by mouth nightly as needed (manic episodes)      SERTRALINE HCL PO Take 50 mg by mouth every evening       LOSARTAN POTASSIUM PO Take 100 mg by mouth every evening       insulin glargine (LANTUS) 100 UNIT/ML injection Inject 30 Units Subcutaneous At Bedtime.        Lancets Thin MISC 1 Units daily.  Qty: 90 each, Refills: 3    Associated Diagnoses: Essential hypertension, benign      glucose blood VI test strips (ONE TOUCH TEST STRIPS) strip by In Vitro route. Test as directed  Qty: 1 Box, Refills: 12    Associated Diagnoses: Type 2 diabetes, HbA1C goal < 8% (H)      BLOOD GLUCOSE TEST STRIPS STRP for one touch ultra meter  Qty: testing BID, Refills: prn one yr    Associated Diagnoses: Essential hypertension, benign; Type II or unspecified type diabetes mellitus without mention of complication, not stated as uncontrolled      GLUCOSE CONTROL VI  LIQD for one touch ultra  Qty: testing BID, Refills: prn one yr    Associated Diagnoses: Essential hypertension, benign; Type II or unspecified type diabetes mellitus without mention of complication, not stated as uncontrolled               Allergies:         Allergies   Allergen Reactions     Macrobid [Nitrofurantoin] GI Disturbance           Consultations This Hospital Stay:      No consultations were requested during this admission         Condition and Physical on Discharge:      Discharge condition: Stable   Vitals: Blood pressure 166/65, pulse 59, temperature 97.3  F (36.3  C), temperature source Oral, resp. rate 20, weight 67.586 kg (149 lb), SpO2 94 %.     Constitutional: Alert but not orientated. Pleasant.    Lungs: CTAB   Cardiovascular: RRR with no murmur.    Abdomen: No abdominal pain with palpation.   Skin: No rashes/open sores.    Other:          Discharge Time:      Less than 30 minutes.        Image Results From This Hospital Stay (For Non-EPIC Providers):        Results for orders placed or performed during the hospital encounter of 01/26/17   CT Head w/o Contrast    Narrative    CT SCAN OF THE HEAD WITHOUT CONTRAST   1/26/2017  7:37 PM     HISTORY: Trauma    TECHNIQUE:  Axial images of the head and coronal reformations without  IV contrast material.  Radiation dose for this scan was reduced using  automated exposure control, adjustment of the mA and/or kV according  to patient size, or iterative reconstruction technique.    COMPARISON: 10/11/2016.    FINDINGS: Mild to moderate cerebral atrophy is present. There are  patchy white matter changes in both hemispheres without mass effect.  There is also cerebellar atrophy. There is no evidence for  intracranial hemorrhage, mass effect, acute infarct, or skull  fracture. There is some chronic opacification in the right maxillary  sinus.      Impression    IMPRESSION: No evidence for intracranial hemorrhage or any acute  process. Chronic changes similar  to prior exam noted.    MATTHEW MONAHAN MD   CT Cervical Spine w/o Contrast    Narrative    CT CERVICAL SPINE WITHOUT CONTRAST   1/26/2017  7:38 PM     HISTORY: Pain     TECHNIQUE: Axial images of the cervical spine were obtained without  intravenous contrast. Multiplanar reformations were performed.  Radiation dose for this scan was reduced using automated exposure  control, adjustment of the mA and/or kV according to patient size, or  iterative reconstruction technique.     COMPARISON: 10/11/2016    FINDINGS: Sagittal reconstructions demonstrate minimal degenerative  spondylolisthesis of C4 on C5, otherwise normal posterior alignment.  There is no evidence for fracture. Mild-to-moderate multilevel  degenerative disc and facet disease is present. Degenerative changes  are most advanced at the C5-C6 level where there is moderate central  canal stenosis and some mild cord deformity. This is similar to that  seen on the prior exam.      Impression    IMPRESSION: Degenerative changes. No evidence for any fracture.    MATTHEW MONAHAN MD   Pelvis XR, 1-2 views    Narrative    PELVIS ONE TO TWO VIEWS   1/26/2017 7:53 PM     INDICATION: Pain, fall.    COMPARISON: None.      Impression    IMPRESSION: No fractures or other acute findings. Degenerative changes  lower lumbar spine. Small benign calcification adjacent to the left  innominate bone.     PATI SAMANO MD   Shoulder XR, G/E 3 views, right    Narrative    RIGHT SHOULDER THREE OR MORE VIEWS   1/26/2017 7:53 PM     INDICATION: Pain.    COMPARISON: None.      Impression    IMPRESSION: On the AP view there is a vague rounded lucency in the  right humeral neck measuring approximately 1.8 cm. This is not  convincingly seen on other views and may be artifactual. Otherwise no  fracture or dislocation. Mild degenerative changes right shoulder.  Surgical clips right axillary region.     PATI SAMANO MD   XR Chest 1 View    Narrative    CHEST ONE VIEW   1/26/2017 7:52 PM      INDICATION: Chest pain after fall.    COMPARISON: 6/16/2015.      Impression    IMPRESSION: Borderline cardiomegaly is stable. Moderate-sized  esophageal hiatal hernia. No acute infiltrates or pneumothorax.  Surgical clips right axillary region.     PATI SAMANO MD           Most Recent Lab Results In EPIC (For Non-EPIC Providers):    Most Recent 3 CBC's:  Recent Labs   Lab Test  01/28/17   0555  01/26/17   1847  10/11/16   0349  10/11/16   0340   WBC  5.5  9.7   --   12.9*   HGB  11.5*  12.7  12.2  12.0   MCV  91  89   --   90   PLT  258  316   --   289      Most Recent 3 BMP's:  Recent Labs   Lab Test  01/28/17   0555  01/27/17   1118  01/26/17   1847   NA  143  140  138   POTASSIUM  3.9  4.3  4.4   CHLORIDE  112*  109  104   CO2  23  25  23   BUN  20  22  23   CR  1.15*  1.33*  1.27*   ANIONGAP  8  6  11   RONIT  8.2*  8.5  8.8   GLC  128*  169*  196*     Most Recent 3 Troponin's:No lab results found.  Most Recent 3 INR's:  Recent Labs   Lab Test  01/26/17   1847  06/16/15   1834  09/17/11   0645   INR  1.10  1.00  1.08     Most Recent 2 LFT's:  Recent Labs   Lab Test  06/16/15   1834  09/12/11   1037   AST  20  15   ALT  17  17   ALKPHOS  68  64   BILITOTAL  0.4  0.6     Most Recent Cholesterol Panel:  Recent Labs   Lab Test  09/12/11   1037   CHOL  161   LDL  71   HDL  44*   TRIG  231*     Most Recent 6 Bacteria Isolates From Any Culture (See EPIC Reports for Culture Details):  Recent Labs   Lab Test  01/28/17   0610  10/11/16   0400  08/16/10   1500  07/29/10   1447  06/09/10   1125  02/01/10   1013   CULT  Pending  10,000 to 50,000 colonies/mL Enterococcus species  10,000 to 50,000 colonies/mL Escherichia coli  *  <10,000 colonies/mL Klebsiella pneumoniae  10 to 50,000 colonies/mL Multiple species present, probable perineal  >100,000 colonies/mL Multiple species present, probable perineal contamination.  10 to 50,000 colonies/mL Multiple species present, probable perineal  contamination.     Most  Recent TSH, T4 and HgbA1c:   Recent Labs   Lab Test  01/28/17   0555   09/12/11   1037   11/22/10   1430   TSH   --    --   2.15   < >  1.22   T4   --    --    --    --   1.29   A1C  9.9*   < >  9.2*   < >   --     < > = values in this interval not displayed.

## 2017-01-28 NOTE — ED NOTES
PRIMARY DIAGNOSIS: /right chest pain/generalized weakness  OUTPATIENT/OBSERVATION GOALS TO BE MET BEFORE DISCHARGE:    1. Tolerate PO Intake: Yes  2. Cleared for discharge from consultants involved: N/A  3. ADLs back to baseline?  No,pt has generalized weakness and needs assist of one ,pt is normally independent at home.  4. Activity and level of assistance: Up with assist of one and walker.  5. Pain status:c/o right chest pain with movement,  6. Barriers to discharge noted No  Is patient a falls risk? Yes  Reason for falls risk:  Mobility  Falls armband on?  YES,    Within Arm's Reach? Yes    Bed alarm turned on?   YES,    Personal alarm in place and turned on?   YES,    Pt slept most of the shift.  Pt. will probably discharge home tomorrow  with her daughter.

## 2017-01-31 LAB
BACTERIA SPEC CULT: ABNORMAL
Lab: ABNORMAL
MICRO REPORT STATUS: ABNORMAL
MICROORGANISM SPEC CULT: ABNORMAL
MICROORGANISM SPEC CULT: ABNORMAL
SPECIMEN SOURCE: ABNORMAL

## 2023-03-22 NOTE — ED NOTES
PRIMARY DIAGNOSIS: /right chest pain/generalized weakness/fall   OUTPATIENT/OBSERVATION GOALS TO BE MET BEFORE DISCHARGE:   1. Tolerate PO Intake: Yes   2. Cleared for discharge from consultants involved: N/A   3. ADLs back to baseline? No,pt has generalized weakness and needs assist of one ,pt is normally independent at home.   4. Activity and level of assistance: Up with assist of one and walker.   5. Pain status: Denies pain at this time   6. Barriers to discharge noted No   Is patient a falls risk? Yes Reason for falls risk: Mobility   Falls armband on? YES   Within Arm's Reach? Yes   Bed alarm turned on? YES   Personal alarm in place and turned on? YES   Pt sleeping at this time. Denies pain. VSS. Ax1 with walker when oob. Will continue to monitor.     Patient disoriented to time and place. Able to state her birthday but stated year wrong and stated she was at friends house. Denies pain but grimances right side with movement. Generalized weakness and states she is dizzy. Will ambulate and access. Blood pressure high but worse when awake. Did not not need correction insulin at breakfast. Video monitor and bed alarm on.      See Physician's progress note.